# Patient Record
Sex: MALE | Race: WHITE | NOT HISPANIC OR LATINO | Employment: OTHER | URBAN - NONMETROPOLITAN AREA
[De-identification: names, ages, dates, MRNs, and addresses within clinical notes are randomized per-mention and may not be internally consistent; named-entity substitution may affect disease eponyms.]

---

## 2017-07-06 ENCOUNTER — OFFICE VISIT (OUTPATIENT)
Dept: FAMILY MEDICINE | Facility: OTHER | Age: 32
End: 2017-07-06
Attending: FAMILY MEDICINE
Payer: COMMERCIAL

## 2017-07-06 VITALS
DIASTOLIC BLOOD PRESSURE: 62 MMHG | OXYGEN SATURATION: 98 % | HEART RATE: 103 BPM | BODY MASS INDEX: 28.79 KG/M2 | TEMPERATURE: 98.7 F | SYSTOLIC BLOOD PRESSURE: 118 MMHG | RESPIRATION RATE: 18 BRPM | WEIGHT: 190 LBS | HEIGHT: 68 IN

## 2017-07-06 DIAGNOSIS — R21 RASH: ICD-10-CM
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
## 2017-07-06 PROCEDURE — 86618 LYME DISEASE ANTIBODY: CPT | Mod: 90 | Performed by: FAMILY MEDICINE

## 2017-07-06 PROCEDURE — 86666 EHRLICHIA ANTIBODY: CPT | Mod: 90 | Performed by: FAMILY MEDICINE

## 2017-07-06 PROCEDURE — 99213 OFFICE O/P EST LOW 20 MIN: CPT | Performed by: FAMILY MEDICINE

## 2017-07-06 PROCEDURE — 36415 COLL VENOUS BLD VENIPUNCTURE: CPT | Performed by: FAMILY MEDICINE

## 2017-07-06 PROCEDURE — 99000 SPECIMEN HANDLING OFFICE-LAB: CPT | Performed by: FAMILY MEDICINE

## 2017-07-06 RX ORDER — DOXYCYCLINE 100 MG/1
100 TABLET ORAL 2 TIMES DAILY
Qty: 28 TABLET | Refills: 0 | Status: SHIPPED | OUTPATIENT
Start: 2017-07-06 | End: 2017-07-10

## 2017-07-06 ASSESSMENT — PAIN SCALES - GENERAL: PAINLEVEL: MODERATE PAIN (4)

## 2017-07-06 NOTE — NURSING NOTE
"Chief Complaint   Patient presents with     Tick Bite     pulled off tick last week, now yesterday has ring area on upper left chest. Body aches and headache       Initial /62  Pulse 103  Temp 98.7  F (37.1  C) (Tympanic)  Resp 18  Ht 5' 8\" (1.727 m)  Wt 190 lb (86.2 kg)  SpO2 98%  BMI 28.89 kg/m2 Estimated body mass index is 28.89 kg/(m^2) as calculated from the following:    Height as of this encounter: 5' 8\" (1.727 m).    Weight as of this encounter: 190 lb (86.2 kg).  Medication Reconciliation: complete   Susana Aaron      "

## 2017-07-06 NOTE — MR AVS SNAPSHOT
"              After Visit Summary   7/6/2017    Hrai Mederos    MRN: [id number]           Patient Information     Date Of Birth          1985        Visit Information        Provider Department      7/6/2017 2:00 PM Vinita Lay MD [facility] [place]        Today's Diagnoses     Tick bite, initial encounter    -  1       Follow-ups after your visit        Who to contact     If you have questions or need follow up information about today's clinic visit or your schedule please contact [facility] [place] directly at [phone].  Normal or non-critical lab and imaging results will be communicated to you by MyChart, letter or phone within 4 business days after the clinic has received the results. If you do not hear from us within 7 days, please contact the clinic through [employer] or phone. If you have a critical or abnormal lab result, we will notify you by phone as soon as possible.  Submit refill requests through [employer] or call your pharmacy and they will forward the refill request to us. Please allow 3 business days for your refill to be completed.          Additional Information About Your Visit        MyChart Information     [employer] gives you secure access to your electronic health record. If you see a primary care provider, you can also send messages to your care team and make appointments. If you have questions, please call your primary care clinic.  If you do not have a primary care provider, please call [phone] and they will assist you.        Care EveryWhere ID     This is your Care EveryWhere ID. This could be used by other organizations to access your [facility] medical records  [id number]        Your Vitals Were     Pulse Temperature Respirations Height Pulse Oximetry BMI (Body Mass Index)    103 98.7  F (37.1  C) (Tympanic) 18 5' 8\" (1.727 m) 98% 28.89 kg/m2       Blood Pressure from Last 3 Encounters:   07/06/17 118/62   05/10/16 124/74   05/09/16 124/76    Weight from Last " 3 Encounters:   07/06/17 190 lb (86.2 kg)   05/10/16 175 lb (79.4 kg)   05/09/16 183 lb (83 kg)              We Performed the Following     Anaplasma phagocytoph antibody IgG IgM     Lyme Disease Makayla with reflex to WB Serum          Today's Medication Changes          These changes are accurate as of: 7/6/17  2:32 PM.  If you have any questions, ask your nurse or doctor.               Start taking these medicines.        Dose/Directions    doxycycline Monohydrate 100 MG Tabs   Used for:  Tick bite, initial encounter   Started by:  Vinita Lay MD        Dose:  100 mg   Take 100 mg by mouth 2 times daily for 14 days   Quantity:  28 tablet   Refills:  0            Where to get your medicines      These medications were sent to [facility]  [address]  [address] [place] [address]     Phone:  [phone]     doxycycline Monohydrate 100 MG Tabs                Primary Care Provider Office Phone # Fax #    Vinita Lay [phone]       [facility] [place] [address]  [place] [address]        Equal Access to Services     [facility] AH: Hadii aad ku hadasho Soomaali, waaxda luqadaha, qaybta kaalmada adeegyada, cl paigein hayalexandern roxanne hutchins . So [facility] [phone].    ATENCIÓN: Si habla español, tiene a ahumada disposición servicios gratuitos de asistencia lingüística. [facility] [phone].    We comply with applicable federal civil rights laws and [place] laws. We do not discriminate on the basis of race, color, national origin, age, disability sex, sexual orientation or gender identity.            Thank you!     Thank you for choosing [facility]  for your care. Our goal is always to provide you with excellent care. Hearing back from our patients is one way we can continue to improve our services. Please take a few minutes to complete the written survey that you may receive in the mail after your visit with us. Thank you!              Your Updated Medication List - Protect others around you: Learn how to safely use, store and throw away your medicines at www.disposemymeds.org.          This list is accurate as of: 7/6/17  2:32 PM.  Always use your most recent med list.                   Brand Name Dispense Instructions for use Diagnosis    cetirizine 10 MG tablet    zyrTEC     Take 10 mg by mouth as needed for allergies        doxycycline Monohydrate 100 MG Tabs     28 tablet    Take 100 mg by mouth 2 times daily for 14 days    Tick bite, initial encounter

## 2017-07-07 NOTE — PROGRESS NOTES
[facility]    Hari Mederos, 32 year old, male presents with   Chief Complaint   Patient presents with     Tick Bite     pulled off tick 2-3 weeks ago, now yesterday has ring area on upper left chest. Body aches and headache since July 4. . He is sleeping more than usual, and not feeling well, tired and achy.       PAST MEDICAL HISTORY:  Past Medical History:   Diagnosis Date     Ventricular pre-excitation        PAST SURGICAL HISTORY:  Past Surgical History:   Procedure Laterality Date     HERNIA REPAIR       wisdom teeth         SOCIAL HISTORY  Social History     Social History     Marital status:      Spouse name: Nancy     Number of children: 0     Years of education: N/A     Occupational History     construction Self     Social History Main Topics     Smoking status: Never Smoker     Smokeless tobacco: Never Used     Alcohol use 1.5 oz/week     3 drink(s) per week     Drug use: Not on file     Sexual activity: Yes     Partners: Female     Other Topics Concern     Caffeine Concern No     Sleep Concern No     Stress Concern No     Weight Concern No     Social History Narrative       MEDICATIONS:  Prior to Admission medications    Medication Sig Start Date End Date Taking? Authorizing Provider   doxycycline Monohydrate 100 MG TABS Take 100 mg by mouth 2 times daily for 14 days 7/6/17 7/20/17 Yes Vinita Lay MD   cetirizine (ZYRTEC) 10 MG tablet Take 10 mg by mouth as needed for allergies    Reported, Patient       ALLERGIES:     Allergies   Allergen Reactions     Pollen Extract Other (See Comments)     Sneezing       ROS:  CONSTITUTIONAL:see above  E/M: NEGATIVE for ear, mouth and throat problems  R: NEGATIVE for significant cough or SOB  CV: NEGATIVE for chest pain, palpitations or peripheral edema  GI: NEGATIVE for nausea, abdominal pain, heartburn, or change in bowel habits  N: NEGATIVE for weakness, dizziness or paresthesias  P: NEGATIVE for changes in mood or  "affect    EXAM:  /62  Pulse 103  Temp 98.7  F (37.1  C) (Tympanic)  Resp 18  Ht 5' 8\" (1.727 m)  Wt 190 lb (86.2 kg)  SpO2 98%  BMI 28.89 kg/m2 Body mass index is 28.89 kg/(m^2).   GENERAL APPEARANCE: healthy, alert and no distress  EYES: Eyes grossly normal to inspection and conjunctivae and sclerae normal  HENT: ear canals and TM's normal and nose and mouth without ulcers or lesions  NECK: no adenopathy, no asymmetry, masses, or scars and thyroid normal to palpation  RESP: lungs clear to auscultation - no rales, rhonchi or wheezes  CV: regular rates and rhythm, normal S1 S2, no S3 or S4 and no murmur, click or rub  SKIN: erythematous rash 8 cm in diameter. This has a central area of erythema with a ring of clearing with further surrounding erythema over the left upper chest wall. There is a tender lymph node in the left outer axilla that Aron notes is smaller than it was  NEURO: Normal strength and tone, mentation intact and speech normal  PSYCH: mentation appears normal and affect normalfatigued    LABS AND IMAGING:     No results found for this or any previous visit.      ASSESSMENT/PLAN:  (W57.XXXA) Tick bite, initial encounter  (primary encounter diagnosis)  Comment: this appears to be lyme disease  Plan: Lyme Disease Makayla with reflex to WB Serum,         Anaplasma phagocytoph antibody IgG IgM,         doxycycline Monohydrate 100 MG TABS        Will check labs but go ahead and treat with Doxycycline 100 mg bid for 14 days  Will also check for Ehrlichiosis. Follow up for concerns    Vinita Lay MD  July 6, 2017          "

## 2017-07-10 LAB
A PHAGOCYTOPH IGG TITR SER IF: NORMAL {TITER}
A PHAGOCYTOPH IGM TITR SER IF: NORMAL {TITER}
B BURGDOR IGG+IGM SER QL: 0.06 (ref 0–0.89)

## 2017-07-10 RX ORDER — DOXYCYCLINE 100 MG/1
100 TABLET ORAL 2 TIMES DAILY
Qty: 14 TABLET | Refills: 0 | Status: SHIPPED | OUTPATIENT
Start: 2017-07-10 | End: 2017-07-17

## 2017-07-21 DIAGNOSIS — R21 RASH: ICD-10-CM
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
## 2017-07-21 PROCEDURE — 86617 LYME DISEASE ANTIBODY: CPT | Mod: 90 | Performed by: FAMILY MEDICINE

## 2017-07-21 PROCEDURE — 86618 LYME DISEASE ANTIBODY: CPT | Mod: 90 | Performed by: FAMILY MEDICINE

## 2017-07-21 PROCEDURE — 99000 SPECIMEN HANDLING OFFICE-LAB: CPT | Performed by: FAMILY MEDICINE

## 2017-07-21 PROCEDURE — 36415 COLL VENOUS BLD VENIPUNCTURE: CPT | Performed by: FAMILY MEDICINE

## 2017-07-25 LAB — B BURGDOR IGG+IGM SER QL: 3.51 (ref 0–0.89)

## 2017-07-27 PROBLEM — A69.20 LYME DISEASE: Status: ACTIVE | Noted: 2017-07-27

## 2017-07-27 LAB
B BURGDOR IGG SER QL IB: ABNORMAL
B BURGDOR IGM SER QL IB: ABNORMAL

## 2018-01-25 ENCOUNTER — APPOINTMENT (OUTPATIENT)
Dept: GENERAL RADIOLOGY | Facility: HOSPITAL | Age: 33
End: 2018-01-25
Attending: PHYSICIAN ASSISTANT
Payer: COMMERCIAL

## 2018-01-25 ENCOUNTER — HOSPITAL ENCOUNTER (EMERGENCY)
Facility: HOSPITAL | Age: 33
Discharge: HOME | End: 2018-01-25
Attending: PHYSICIAN ASSISTANT | Admitting: PHYSICIAN ASSISTANT
Payer: COMMERCIAL

## 2018-01-25 VITALS
OXYGEN SATURATION: 98 % | DIASTOLIC BLOOD PRESSURE: 92 MMHG | TEMPERATURE: 98.8 F | RESPIRATION RATE: 16 BRPM | SYSTOLIC BLOOD PRESSURE: 146 MMHG | HEART RATE: 87 BPM

## 2018-01-25 DIAGNOSIS — B34.9 VIRAL SYNDROME: ICD-10-CM

## 2018-01-25 LAB
ANION GAP SERPL CALCULATED.3IONS-SCNC: 8 MMOL/L (ref 3–14)
BASOPHILS # BLD AUTO: 0 10E9/L (ref 0–0.2)
BASOPHILS NFR BLD AUTO: 0.7 %
BUN SERPL-MCNC: 12 MG/DL (ref 7–30)
CALCIUM SERPL-MCNC: 8.9 MG/DL (ref 8.5–10.1)
CHLORIDE SERPL-SCNC: 102 MMOL/L (ref 94–109)
CO2 SERPL-SCNC: 29 MMOL/L (ref 20–32)
CREAT SERPL-MCNC: 1.02 MG/DL (ref 0.66–1.25)
DIFFERENTIAL METHOD BLD: NORMAL
EOSINOPHIL # BLD AUTO: 0 10E9/L (ref 0–0.7)
EOSINOPHIL NFR BLD AUTO: 0.4 %
ERYTHROCYTE [DISTWIDTH] IN BLOOD BY AUTOMATED COUNT: 12.2 % (ref 10–15)
FLUAV+FLUBV AG SPEC QL: NEGATIVE
FLUAV+FLUBV AG SPEC QL: NEGATIVE
GFR SERPL CREATININE-BSD FRML MDRD: 83 ML/MIN/1.7M2
GLUCOSE SERPL-MCNC: 100 MG/DL (ref 70–99)
HCT VFR BLD AUTO: 46 % (ref 40–53)
HGB BLD-MCNC: 16.2 G/DL (ref 13.3–17.7)
IMM GRANULOCYTES # BLD: 0 10E9/L (ref 0–0.4)
IMM GRANULOCYTES NFR BLD: 0.2 %
LYMPHOCYTES # BLD AUTO: 2.2 10E9/L (ref 0.8–5.3)
LYMPHOCYTES NFR BLD AUTO: 39.7 %
MCH RBC QN AUTO: 29.3 PG (ref 26.5–33)
MCHC RBC AUTO-ENTMCNC: 35.2 G/DL (ref 31.5–36.5)
MCV RBC AUTO: 83 FL (ref 78–100)
MONOCYTES # BLD AUTO: 0.6 10E9/L (ref 0–1.3)
MONOCYTES NFR BLD AUTO: 10.6 %
NEUTROPHILS # BLD AUTO: 2.7 10E9/L (ref 1.6–8.3)
NEUTROPHILS NFR BLD AUTO: 48.4 %
NRBC # BLD AUTO: 0 10*3/UL
NRBC BLD AUTO-RTO: 0 /100
PLATELET # BLD AUTO: 199 10E9/L (ref 150–450)
POTASSIUM SERPL-SCNC: 4 MMOL/L (ref 3.4–5.3)
RBC # BLD AUTO: 5.52 10E12/L (ref 4.4–5.9)
SODIUM SERPL-SCNC: 139 MMOL/L (ref 133–144)
SPECIMEN SOURCE: NORMAL
WBC # BLD AUTO: 5.6 10E9/L (ref 4–11)

## 2018-01-25 PROCEDURE — 87804 INFLUENZA ASSAY W/OPTIC: CPT | Mod: 59 | Performed by: FAMILY MEDICINE

## 2018-01-25 PROCEDURE — 71046 X-RAY EXAM CHEST 2 VIEWS: CPT | Mod: TC

## 2018-01-25 PROCEDURE — 25000132 ZZH RX MED GY IP 250 OP 250 PS 637: Performed by: PHYSICIAN ASSISTANT

## 2018-01-25 PROCEDURE — 36415 COLL VENOUS BLD VENIPUNCTURE: CPT | Performed by: PHYSICIAN ASSISTANT

## 2018-01-25 PROCEDURE — 25000125 ZZHC RX 250: Performed by: PHYSICIAN ASSISTANT

## 2018-01-25 PROCEDURE — G0463 HOSPITAL OUTPT CLINIC VISIT: HCPCS | Mod: 25

## 2018-01-25 PROCEDURE — 99213 OFFICE O/P EST LOW 20 MIN: CPT | Performed by: PHYSICIAN ASSISTANT

## 2018-01-25 PROCEDURE — 80048 BASIC METABOLIC PNL TOTAL CA: CPT | Performed by: PHYSICIAN ASSISTANT

## 2018-01-25 PROCEDURE — 85025 COMPLETE CBC W/AUTO DIFF WBC: CPT | Performed by: PHYSICIAN ASSISTANT

## 2018-01-25 RX ADMIN — LIDOCAINE HYDROCHLORIDE 15 ML GIVEN: 20 SOLUTION ORAL; TOPICAL at 21:09

## 2018-01-25 ASSESSMENT — ENCOUNTER SYMPTOMS
EYE DISCHARGE: 0
HEADACHES: 0
COUGH: 0
PSYCHIATRIC NEGATIVE: 1
DIZZINESS: 0
SHORTNESS OF BREATH: 0
FATIGUE: 1
LIGHT-HEADEDNESS: 0
MYALGIAS: 1
WHEEZING: 0
TROUBLE SWALLOWING: 0
CHILLS: 0
CARDIOVASCULAR NEGATIVE: 1
SINUS PRESSURE: 0
SORE THROAT: 0
RHINORRHEA: 0
FEVER: 0
CHEST TIGHTNESS: 1

## 2018-01-25 NOTE — ED AVS SNAPSHOT
[facility]    [address]    [place] [place] [address]    Phone:  [phone]                                       Hari Mederso   MRN: [id number]    Department:  [facility]   Date of Visit:  1/25/2018           Patient Information     Date Of Birth          1985        Your diagnoses for this visit were:     Viral syndrome        You were seen by Peng Gaspar PA.      Follow-up Information     Follow up with Vinita Lay MD In 1 week.    Specialty:  Family Practice    Contact information:    [facility] [place]  [address]  [place] [address]  [phone]          Follow up with [facility].    Specialty:  EMERGENCY MEDICINE    Why:  If symptoms worsen    Contact information:    [address] [place] [address] [phone]    Additional information:    From [facility] ([place], [place]): Take [address]. Turn left at [address]. Turn left at the first stoplight on [address] [place] [address]. At the first stop sign, take a right onto [address]. Take a left into the parking lot and continue through until you reach the North enterance of the building.       From [place]: Take [address]. Take the [address] ramp towards [place]. Turn left onto [address]. Take a slight right onto [address]. Turn left at the first stoplight on [address]. At the first stop sign, take a right onto [address]. Take a left into the parking lot and continue through until you reach the North enterance of the building.       From [place]: Take [address]. Take a right at [address]. At the first stop sign, take a right onto [address]. Take a left into the parking lot and continue through until you reach the North enterance of the building.         Discharge Instructions       - rest, fluids  - Ibuprofen/tlyenol rotation  - Consider the following over-the-counter product as antiviral -  sambucus/elderberry for viral upper-respiratory symptoms.        Discharge References/Attachments     VIRAL SYNDROME (ADULT) (ENGLISH)         Review of your medicines      Our records show that you are taking the medicines listed below. If these are incorrect, please call your family doctor or clinic.        Dose / Directions Last dose taken    cetirizine 10 MG tablet   Commonly known as:  zyrTEC   Dose:  10 mg        Take 10 mg by mouth as needed for allergies   Refills:  0                Procedures and tests performed during your visit     Basic metabolic panel    CBC with platelets differential    Chest XR,  PA & LAT    Influenza A/B antigen      Orders Needing Specimen Collection     None      Pending Results     No orders found from 1/23/2018 to 1/26/2018.            Pending Culture Results     No orders found from 1/23/2018 to 1/26/2018.            Thank you for choosing [facility]       Thank you for choosing [facility] for your care. Our goal is always to provide you with excellent care. Hearing back from our patients is one way we can continue to improve our services. Please take a few minutes to complete the written survey that you may receive in the mail after you visit with us. Thank you!        [employer] Information     [employer] gives you secure access to your electronic health record. If you see a primary care provider, you can also send messages to your care team and make appointments. If you have questions, please call your primary care clinic.  If you do not have a primary care provider, please call [phone] and they will assist you.        Care EveryWhere ID     This is your Care EveryWhere ID. This could be used by other organizations to access your [facility] medical records  [id number]        Equal Access to Services     TRISH JOHNSON : Rory Ann, juani horvath, cl servin. So [facility] [phone].    ATENCIÓN: Jenn ray  español, tiene a ahumada disposición servicios gratuitos de asistencia lingüística. Farrah al [phone].    We comply with applicable federal civil rights laws and [place] laws. We do not discriminate on the basis of race, color, national origin, age, disability, sex, sexual orientation, or gender identity.            After Visit Summary       This is your record. Keep this with you and show to your community pharmacist(s) and doctor(s) at your next visit.

## 2018-01-25 NOTE — ED AVS SNAPSHOT
[facility]    [address]    [place] [address]    Phone:  [phone]                                       Hari Mederos   MRN: [id number]    Department:  [facility]   Date of Visit:  1/25/2018           After Visit Summary Signature Page     I have received my discharge instructions, and my questions have been answered. I have discussed any challenges I see with this plan with the nurse or doctor.    ..........................................................................................................................................  Patient/Patient Representative Signature      ..........................................................................................................................................  Patient Representative Print Name and Relationship to Patient    ..................................................               ................................................  Date                                            Time    ..........................................................................................................................................  Reviewed by Signature/Title    ...................................................              ..............................................  Date                                                            Time

## 2018-01-26 NOTE — ED PROVIDER NOTES
"  History     Chief Complaint   Patient presents with     Flu Symptoms     HPI  Hari Mederos is a 32 year old male who presents with 24 hrs fatigue, body aches, \"cold feeling in lungs\", throat feels funny when his lungs feel cold - warm water usually helps with the cold lung sensation. Cough is very infrequent. No fevers. He has not taken anything for symptoms today. No ill contacts.     Problem List:    Patient Active Problem List    Diagnosis Date Noted     Lyme disease 07/27/2017     Priority: Medium     treated          Past Medical History:    Past Medical History:   Diagnosis Date     Ventricular pre-excitation        Past Surgical History:    Past Surgical History:   Procedure Laterality Date     HERNIA REPAIR       wisdom teeth         Family History:    Family History   Problem Relation Age of Onset     Other - See Comments Mother      Graves Disease     DIABETES Father      Arthritis Father      CEREBROVASCULAR DISEASE Paternal Grandfather      Arthritis Mother      Rheumatoid arthritis     DIABETES Maternal Grandmother      Alzheimer Disease Maternal Grandmother      CANCER Other      cousin brain tumor     Congenital Anomalies Other      cousin cystic fibrosis       Social History:  Marital Status:   [2]  Social History   Substance Use Topics     Smoking status: Never Smoker     Smokeless tobacco: Never Used     Alcohol use 1.5 oz/week     3 drink(s) per week        Medications:      cetirizine (ZYRTEC) 10 MG tablet         Review of Systems   Constitutional: Positive for fatigue. Negative for chills and fever.   HENT: Negative for congestion, ear pain, postnasal drip, rhinorrhea, sinus pressure, sore throat and trouble swallowing.    Eyes: Negative for discharge.   Respiratory: Positive for chest tightness (\"cold lungs\"). Negative for cough, shortness of breath and wheezing.    Cardiovascular: Negative.    Musculoskeletal: Positive for myalgias.   Skin: Negative.    Neurological: Negative " "for dizziness, light-headedness and headaches.   Psychiatric/Behavioral: Negative.        Physical Exam   BP: 146/92  Pulse: 87  Temp: 98.8  F (37.1  C)  Resp: 16  SpO2: 98 %      Physical Exam   Constitutional: He is oriented to person, place, and time. He appears well-developed and well-nourished. No distress.   HENT:   Head: Normocephalic and atraumatic.   Right Ear: External ear normal.   Left Ear: External ear normal.   Nose: Nose normal.   Mouth/Throat: Oropharynx is clear and moist. No oropharyngeal exudate.   Eyes: Conjunctivae and EOM are normal. Right eye exhibits no discharge. Left eye exhibits no discharge.   Neck: Normal range of motion. Neck supple.   Cardiovascular: Normal rate, regular rhythm and normal heart sounds.    Pulmonary/Chest: Effort normal and breath sounds normal. He has no wheezes. He has no rales.   Abdominal: Soft. Bowel sounds are normal. There is no tenderness.   Neurological: He is alert and oriented to person, place, and time.   Skin: Skin is warm and dry.   Psychiatric: He has a normal mood and affect.   Nursing note and vitals reviewed.      ED Course     ED Course     Procedures  Medications   lidocaine (viscous) (XYLOCAINE) 2 % 15 mL, alum & mag hydroxide-simethicone (MYLANTA ES/MAALOX  ES) 15 mL GI Cocktail (15 ml given Oral Given 1/25/18 2109)       Labs Ordered and Resulted from Time of ED Arrival Up to the Time of Departure from the ED   BASIC METABOLIC PANEL - Abnormal; Notable for the following:        Result Value    Glucose 100 (*)     All other components within normal limits   CBC WITH PLATELETS DIFFERENTIAL   INFLUENZA A/B ANTIGEN     PROCEDURE:  XR CHEST 2 VW     HISTORY:  \"cold lungs\"; .      COMPARISON:  None.     FINDINGS:   The cardiac silhouette is normal in size. The pulmonary vasculature is  normal.  The lungs are clear. No pleural effusion or pneumothorax.         IMPRESSION:  No acute cardiopulmonary disease.       DELMA CALDWELL MD    Assessments & Plan " (with Medical Decision Making)     I have reviewed the nursing notes.  I have reviewed the findings, diagnosis, plan and need for follow up with the patient.    Discharge Medication List as of 1/25/2018 10:20 PM          Final diagnoses:   Viral syndrome   Influenza swab negative. CXR, CBC, BMP acceptable. Attempted GI cocktail as warm water helps with chest sensation and this did give temporary relief. Discussed rest, ibu, fluids for 3-5 days and treat as viral. Follow up with Primary Care Provider in 5-7 days with poor improvement. Seek attention sooner with worsening despite treatment. Patient verbally educated and given appropriate education sheets for each of the diagnoses and has no questions.    Peng Gaspar PA-C   1/26/2018   8:15 AM       1/25/2018   [facility]     Peng Gaspar PA  01/26/18 0816

## 2018-01-26 NOTE — DISCHARGE INSTRUCTIONS
- rest, fluids  - Ibuprofen/tlyenol rotation  - Consider the following over-the-counter product as antiviral - sambucus/elderberry for viral upper-respiratory symptoms.

## 2018-02-02 ENCOUNTER — TELEPHONE (OUTPATIENT)
Dept: CARDIOLOGY | Facility: OTHER | Age: 33
End: 2018-02-02

## 2018-02-02 ENCOUNTER — OFFICE VISIT (OUTPATIENT)
Dept: INTERNAL MEDICINE | Facility: OTHER | Age: 33
End: 2018-02-02
Attending: INTERNAL MEDICINE
Payer: COMMERCIAL

## 2018-02-02 ENCOUNTER — RADIANT APPOINTMENT (OUTPATIENT)
Dept: GENERAL RADIOLOGY | Facility: OTHER | Age: 33
End: 2018-02-02
Attending: INTERNAL MEDICINE
Payer: COMMERCIAL

## 2018-02-02 VITALS
BODY MASS INDEX: 28.04 KG/M2 | HEART RATE: 81 BPM | DIASTOLIC BLOOD PRESSURE: 78 MMHG | SYSTOLIC BLOOD PRESSURE: 118 MMHG | OXYGEN SATURATION: 96 % | TEMPERATURE: 98.3 F | WEIGHT: 185 LBS | HEIGHT: 68 IN

## 2018-02-02 DIAGNOSIS — R10.84 ABDOMINAL PAIN, GENERALIZED: Primary | ICD-10-CM

## 2018-02-02 DIAGNOSIS — R10.84 ABDOMINAL PAIN, GENERALIZED: ICD-10-CM

## 2018-02-02 DIAGNOSIS — R53.83 OTHER FATIGUE: ICD-10-CM

## 2018-02-02 LAB
ALBUMIN SERPL-MCNC: 4.1 G/DL (ref 3.4–5)
ALBUMIN UR-MCNC: NEGATIVE MG/DL
ALP SERPL-CCNC: 43 U/L (ref 40–150)
ALT SERPL W P-5'-P-CCNC: 25 U/L (ref 0–70)
AMYLASE SERPL-CCNC: 31 U/L (ref 30–110)
ANION GAP SERPL CALCULATED.3IONS-SCNC: 7 MMOL/L (ref 3–14)
APPEARANCE UR: CLEAR
AST SERPL W P-5'-P-CCNC: 11 U/L (ref 0–45)
BASOPHILS # BLD AUTO: 0 10E9/L (ref 0–0.2)
BASOPHILS NFR BLD AUTO: 0.7 %
BILIRUB SERPL-MCNC: 1.4 MG/DL (ref 0.2–1.3)
BILIRUB UR QL STRIP: NEGATIVE
BUN SERPL-MCNC: 13 MG/DL (ref 7–30)
CALCIUM SERPL-MCNC: 9 MG/DL (ref 8.5–10.1)
CHLORIDE SERPL-SCNC: 102 MMOL/L (ref 94–109)
CO2 SERPL-SCNC: 30 MMOL/L (ref 20–32)
COLOR UR AUTO: NORMAL
CREAT SERPL-MCNC: 1.04 MG/DL (ref 0.66–1.25)
DIFFERENTIAL METHOD BLD: NORMAL
EOSINOPHIL # BLD AUTO: 0 10E9/L (ref 0–0.7)
EOSINOPHIL NFR BLD AUTO: 0.2 %
ERYTHROCYTE [DISTWIDTH] IN BLOOD BY AUTOMATED COUNT: 12.1 % (ref 10–15)
GFR SERPL CREATININE-BSD FRML MDRD: 81 ML/MIN/1.7M2
GLUCOSE SERPL-MCNC: 91 MG/DL (ref 70–99)
GLUCOSE UR STRIP-MCNC: NEGATIVE MG/DL
HCT VFR BLD AUTO: 46.6 % (ref 40–53)
HETEROPH AB SER QL: NEGATIVE
HGB BLD-MCNC: 16.5 G/DL (ref 13.3–17.7)
HGB UR QL STRIP: NEGATIVE
IMM GRANULOCYTES # BLD: 0 10E9/L (ref 0–0.4)
IMM GRANULOCYTES NFR BLD: 0.2 %
KETONES UR STRIP-MCNC: NEGATIVE MG/DL
LEUKOCYTE ESTERASE UR QL STRIP: NEGATIVE
LIPASE SERPL-CCNC: 99 U/L (ref 73–393)
LYMPHOCYTES # BLD AUTO: 2.1 10E9/L (ref 0.8–5.3)
LYMPHOCYTES NFR BLD AUTO: 33.9 %
MCH RBC QN AUTO: 29.5 PG (ref 26.5–33)
MCHC RBC AUTO-ENTMCNC: 35.4 G/DL (ref 31.5–36.5)
MCV RBC AUTO: 83 FL (ref 78–100)
MONOCYTES # BLD AUTO: 0.5 10E9/L (ref 0–1.3)
MONOCYTES NFR BLD AUTO: 7.9 %
NEUTROPHILS # BLD AUTO: 3.5 10E9/L (ref 1.6–8.3)
NEUTROPHILS NFR BLD AUTO: 57.1 %
NITRATE UR QL: NEGATIVE
NRBC # BLD AUTO: 0 10*3/UL
NRBC BLD AUTO-RTO: 0 /100
PH UR STRIP: 6 PH (ref 4.7–8)
PLATELET # BLD AUTO: 200 10E9/L (ref 150–450)
POTASSIUM SERPL-SCNC: 3.8 MMOL/L (ref 3.4–5.3)
PROT SERPL-MCNC: 8.2 G/DL (ref 6.8–8.8)
RBC # BLD AUTO: 5.6 10E12/L (ref 4.4–5.9)
SODIUM SERPL-SCNC: 139 MMOL/L (ref 133–144)
SOURCE: NORMAL
SP GR UR STRIP: 1.01 (ref 1–1.03)
UROBILINOGEN UR STRIP-MCNC: NORMAL MG/DL (ref 0–2)
WBC # BLD AUTO: 6.1 10E9/L (ref 4–11)

## 2018-02-02 PROCEDURE — 99000 SPECIMEN HANDLING OFFICE-LAB: CPT | Performed by: INTERNAL MEDICINE

## 2018-02-02 PROCEDURE — 85025 COMPLETE CBC W/AUTO DIFF WBC: CPT | Performed by: INTERNAL MEDICINE

## 2018-02-02 PROCEDURE — 86617 LYME DISEASE ANTIBODY: CPT | Mod: 90 | Performed by: INTERNAL MEDICINE

## 2018-02-02 PROCEDURE — 74019 RADEX ABDOMEN 2 VIEWS: CPT | Mod: TC

## 2018-02-02 PROCEDURE — 83690 ASSAY OF LIPASE: CPT | Performed by: INTERNAL MEDICINE

## 2018-02-02 PROCEDURE — 86308 HETEROPHILE ANTIBODY SCREEN: CPT | Performed by: INTERNAL MEDICINE

## 2018-02-02 PROCEDURE — 36415 COLL VENOUS BLD VENIPUNCTURE: CPT | Performed by: INTERNAL MEDICINE

## 2018-02-02 PROCEDURE — 82150 ASSAY OF AMYLASE: CPT | Performed by: INTERNAL MEDICINE

## 2018-02-02 PROCEDURE — 80053 COMPREHEN METABOLIC PANEL: CPT | Performed by: INTERNAL MEDICINE

## 2018-02-02 PROCEDURE — 99204 OFFICE O/P NEW MOD 45 MIN: CPT | Mod: 25 | Performed by: INTERNAL MEDICINE

## 2018-02-02 PROCEDURE — 81003 URINALYSIS AUTO W/O SCOPE: CPT | Performed by: INTERNAL MEDICINE

## 2018-02-02 PROCEDURE — 93000 ELECTROCARDIOGRAM COMPLETE: CPT | Performed by: INTERNAL MEDICINE

## 2018-02-02 ASSESSMENT — ANXIETY QUESTIONNAIRES
3. WORRYING TOO MUCH ABOUT DIFFERENT THINGS: NOT AT ALL
7. FEELING AFRAID AS IF SOMETHING AWFUL MIGHT HAPPEN: NOT AT ALL
5. BEING SO RESTLESS THAT IT IS HARD TO SIT STILL: NOT AT ALL
1. FEELING NERVOUS, ANXIOUS, OR ON EDGE: NOT AT ALL
GAD7 TOTAL SCORE: 0
6. BECOMING EASILY ANNOYED OR IRRITABLE: NOT AT ALL
2. NOT BEING ABLE TO STOP OR CONTROL WORRYING: NOT AT ALL

## 2018-02-02 ASSESSMENT — PATIENT HEALTH QUESTIONNAIRE - PHQ9: 5. POOR APPETITE OR OVEREATING: NOT AT ALL

## 2018-02-02 ASSESSMENT — PAIN SCALES - GENERAL: PAINLEVEL: NO PAIN (1)

## 2018-02-02 NOTE — TELEPHONE ENCOUNTER
Please call patient and schedule him to see Dr. Elise for follow up.  Patient has not been seen since 5/2016.   Thanks. Laura.

## 2018-02-02 NOTE — MR AVS SNAPSHOT
"              After Visit Summary   2/2/2018    Hari Mederos    MRN: [id number]           Patient Information     Date Of Birth          1985        Visit Information        Provider Department      2/2/2018 10:15 AM Nahum Barbosa, DO [facility]        Today's Diagnoses     Abdominal pain, generalized    -  1    Other fatigue           Follow-ups after your visit        Who to contact     If you have questions or need follow up information about today's clinic visit or your schedule please contact [facility] directly at [phone].  Normal or non-critical lab and imaging results will be communicated to you by [employer], letter or phone within 4 business days after the clinic has received the results. If you do not hear from us within 7 days, please contact the clinic through [employer] or phone. If you have a critical or abnormal lab result, we will notify you by phone as soon as possible.  Submit refill requests through [employer] or call your pharmacy and they will forward the refill request to us. Please allow 3 business days for your refill to be completed.          Additional Information About Your Visit        MyChart Information     [employer] gives you secure access to your electronic health record. If you see a primary care provider, you can also send messages to your care team and make appointments. If you have questions, please call your primary care clinic.  If you do not have a primary care provider, please call [phone] and they will assist you.        Care EveryWhere ID     This is your Care EveryWhere ID. This could be used by other organizations to access your [place] medical records  [id number]        Your Vitals Were     Pulse Temperature Height Pulse Oximetry BMI (Body Mass Index)       81 98.3  F (36.8  C) (Tympanic) 5' 8\" (1.727 m) 96% 28.13 kg/m2        Blood Pressure from Last 3 Encounters:   02/02/18 118/78   01/25/18 146/92   07/06/17 118/62    " Weight from Last 3 Encounters:   02/02/18 185 lb (83.9 kg)   07/06/17 190 lb (86.2 kg)   05/10/16 175 lb (79.4 kg)              We Performed the Following     Amylase     CBC with platelets and differential     Comprehensive metabolic panel (BMP + Alb, Alk Phos, ALT, AST, Total. Bili, TP)     EKG 12-lead complete w/read - (Clinic Performed)     Lipase     Lyme Conf IgG and IgM by Immunoblot     Mononucleosis screen     UA reflex to Microscopic and Culture - [place]          Today's Medication Changes          These changes are accurate as of 2/2/18 10:58 AM.  If you have any questions, ask your nurse or doctor.               Start taking these medicines.        Dose/Directions    ranitidine 150 MG tablet   Commonly known as:  ZANTAC   Used for:  Abdominal pain, generalized   Started by:  Nahum Barbosa DO        Dose:  150 mg   Take 1 tablet (150 mg) by mouth 2 times daily   Quantity:  60 tablet   Refills:  1            Where to get your medicines      These medications were sent to [facility] [place] [id number]  [address] [place] [address]     Phone:  [phone]     ranitidine 150 MG tablet                Primary Care Provider Office Phone # Fax #    Vinita Lay [phone]       [facility] [address]  [facility] [address]        Equal Access to Services     JOSELYN JOHNSON : Hadii baron lopez hadasho Sojadenali, waaxda luqadaha, qaybta kaalmada adeegyada, cl freire. So [facility] [phone].    ATENCIÓN: Si habla español, tiene a ahumada disposición servicios gratuitos de asistencia lingüística. Farrah al [phone].    We comply with applicable federal civil rights laws and [place] laws. We do not discriminate on the basis of race, color, national origin, age, disability, sex, sexual orientation, or gender identity.            Thank you!     Thank you for choosing [facility]  for your care. Our goal is  always to provide you with excellent care. Hearing back from our patients is one way we can continue to improve our services. Please take a few minutes to complete the written survey that you may receive in the mail after your visit with us. Thank you!             Your Updated Medication List - Protect others around you: Learn how to safely use, store and throw away your medicines at www.disposemymeds.org.          This list is accurate as of 2/2/18 10:58 AM.  Always use your most recent med list.                   Brand Name Dispense Instructions for use Diagnosis    cetirizine 10 MG tablet    zyrTEC     Take 10 mg by mouth as needed for allergies        ranitidine 150 MG tablet    ZANTAC    60 tablet    Take 1 tablet (150 mg) by mouth 2 times daily    Abdominal pain, generalized

## 2018-02-02 NOTE — NURSING NOTE
"Chief Complaint   Patient presents with     Bloated     since last wednesday having stomach pain that comes and goes \"dull and annoying\" wakes him up- feels bloated- no gas, constipation, no fever        Initial /78 (BP Location: Left arm, Patient Position: Supine, Cuff Size: Adult Large)  Pulse 81  Temp 98.3  F (36.8  C) (Tympanic)  Ht 5' 8\" (1.727 m)  Wt 185 lb (83.9 kg)  SpO2 96%  BMI 28.13 kg/m2 Estimated body mass index is 28.13 kg/(m^2) as calculated from the following:    Height as of this encounter: 5' 8\" (1.727 m).    Weight as of this encounter: 185 lb (83.9 kg).  Medication Reconciliation: complete   Vicki Melton LPN      "

## 2018-02-02 NOTE — PROGRESS NOTES
"SUBJECTIVE:    Chief Complaint   Patient presents with     Bloated     since last wednesday having stomach pain that comes and goes \"dull and annoying\" wakes him up- feels bloated- no gas, constipation, no fever        Hari Mederos is a 32 year old male with pmh of WPW and Lyme's disease who presents today due to abdominal pain.  He was seen in the ED recently for flu like symptoms but he tested negative for influenza.  He described fatigue and cold sensation in his chest.  Today he reports that resolved but now feels fatigued once again.  Still a \"cold sensation in lungs\" and now some abdominal discomfort.  He denies any pain and states he may feel bloated.  He has some trouble describing his discomfort and reports it has actually resolved for now.  No N/V/D. He does describe some degree of constipation.             Past Medical History:   Diagnosis Date     Ventricular pre-excitation          Past Surgical History:   Procedure Laterality Date     HERNIA REPAIR       wisdom teeth          Allergies   Allergen Reactions     Pollen Extract Other (See Comments)     Sneezing       Medications:  Current Outpatient Prescriptions   Medication Sig Dispense Refill     ranitidine (ZANTAC) 150 MG tablet Take 1 tablet (150 mg) by mouth 2 times daily 60 tablet 1     cetirizine (ZYRTEC) 10 MG tablet Take 10 mg by mouth as needed for allergies         Family History   Problem Relation Age of Onset     Other - See Comments Mother      Graves Disease     Arthritis Mother      Rheumatoid arthritis     DIABETES Father      Arthritis Father      CEREBROVASCULAR DISEASE Paternal Grandfather      DIABETES Maternal Grandmother      Alzheimer Disease Maternal Grandmother      CANCER Other      cousin brain tumor     Congenital Anomalies Other      cousin cystic fibrosis       Social History   Substance Use Topics     Smoking status: Never Smoker     Smokeless tobacco: Never Used     Alcohol use 1.5 oz/week     3 Standard drinks or " "equivalent per week     Social History     Social History Narrative        Review Of Systems  Constitutional: Fatigue and Malaise  Eyes: negative  Ears/Nose/Throat: negative  Cardiovascular: negative  Respiratory: No shortness of breath, dyspnea on exertion, cough, or hemoptysis  Gastrointestinal: heartburn(?) and abdominal pain  Genitourinary: negative  Musculoskeletal: negative  Skin: negative  Neurologic: negative  Endocrine: negative  Hematologic/Lymphatic/Immunologic: negative  Psychiatric: negative    OBJECTIVE:  /78 (BP Location: Left arm, Patient Position: Supine, Cuff Size: Adult Large)  Pulse 81  Temp 98.3  F (36.8  C) (Tympanic)  Ht 5' 8\" (1.727 m)  Wt 185 lb (83.9 kg)  SpO2 96%  BMI 28.13 kg/m2  Body mass index is 28.13 kg/(m^2).   Gen: Well-developed, well-nourished and in no apparent distress  HEENT:  Normocephalic, atraumatic, PERRL, no scleral icterus, TMs visualized bilaterally without effusion/erythema, external auditory canals clear.  Cranial nerves grossly intact.  Neck: supple, no LAD, no thyromegaly  CV: regular rate and rhythm, normal S1 S2, no S3 or S4 and no murmur, click, or rub  Resp: Clear to ausculation bilaterally, normal respiratory effort  Abd: Bowel sounds present, soft NT/ND,  no masses or hepatosplenomegaly.  States abdominal pain not present right now.    Ext: NO LE edema.    Neuro:  No focal deficits noted  Skin: warm and dry  Psych: normal mood/affect, appropriately oriented      EKG:  WPW    PROCEDURE: XR ABDOMEN 2 VW 2/2/2018 10:27 AM     HISTORY: ; Abdominal pain, generalized     COMPARISONS: None.     TECHNIQUE: Flat and upright     FINDINGS: There is a normal intestinal gas pattern. No extraluminal  gas or pathologic intra-abdominal calcifications are noted.          IMPRESSION: Normal abdominal gas pattern     DELMA CALDWELL MD    Results for orders placed or performed in visit on 02/02/18   Comprehensive metabolic panel (BMP + Alb, Alk Phos, ALT, AST, Total. " Bili, TP)   Result Value Ref Range    Sodium 139 133 - 144 mmol/L    Potassium 3.8 3.4 - 5.3 mmol/L    Chloride 102 94 - 109 mmol/L    Carbon Dioxide 30 20 - 32 mmol/L    Anion Gap 7 3 - 14 mmol/L    Glucose 91 70 - 99 mg/dL    Urea Nitrogen 13 7 - 30 mg/dL    Creatinine 1.04 0.66 - 1.25 mg/dL    GFR Estimate 81 >60 mL/min/1.7m2    GFR Estimate If Black >90 >60 mL/min/1.7m2    Calcium 9.0 8.5 - 10.1 mg/dL    Bilirubin Total 1.4 (H) 0.2 - 1.3 mg/dL    Albumin 4.1 3.4 - 5.0 g/dL    Protein Total 8.2 6.8 - 8.8 g/dL    Alkaline Phosphatase 43 40 - 150 U/L    ALT 25 0 - 70 U/L    AST 11 0 - 45 U/L   CBC with platelets and differential   Result Value Ref Range    WBC 6.1 4.0 - 11.0 10e9/L    RBC Count 5.60 4.4 - 5.9 10e12/L    Hemoglobin 16.5 13.3 - 17.7 g/dL    Hematocrit 46.6 40.0 - 53.0 %    MCV 83 78 - 100 fl    MCH 29.5 26.5 - 33.0 pg    MCHC 35.4 31.5 - 36.5 g/dL    RDW 12.1 10.0 - 15.0 %    Platelet Count 200 150 - 450 10e9/L    Diff Method Automated Method     % Neutrophils 57.1 %    % Lymphocytes 33.9 %    % Monocytes 7.9 %    % Eosinophils 0.2 %    % Basophils 0.7 %    % Immature Granulocytes 0.2 %    Nucleated RBCs 0 0 /100    Absolute Neutrophil 3.5 1.6 - 8.3 10e9/L    Absolute Lymphocytes 2.1 0.8 - 5.3 10e9/L    Absolute Monocytes 0.5 0.0 - 1.3 10e9/L    Absolute Eosinophils 0.0 0.0 - 0.7 10e9/L    Absolute Basophils 0.0 0.0 - 0.2 10e9/L    Abs Immature Granulocytes 0.0 0 - 0.4 10e9/L    Absolute Nucleated RBC 0.0    Amylase   Result Value Ref Range    Amylase 31 30 - 110 U/L   Lipase   Result Value Ref Range    Lipase 99 73 - 393 U/L   UA reflex to Microscopic and Culture - [place]   Result Value Ref Range    Color Urine Light Yellow     Appearance Urine Clear     Glucose Urine Negative NEG^Negative mg/dL    Bilirubin Urine Negative NEG^Negative    Ketones Urine Negative NEG^Negative mg/dL    Specific Gravity Urine 1.006 1.003 - 1.035    Blood Urine Negative NEG^Negative    pH Urine 6.0 4.7 - 8.0 pH     Protein Albumin Urine Negative NEG^Negative mg/dL    Urobilinogen mg/dL Normal 0.0 - 2.0 mg/dL    Nitrite Urine Negative NEG^Negative    Leukocyte Esterase Urine Negative NEG^Negative    Source Midstream Urine    Mononucleosis screen   Result Value Ref Range    Mononucleosis Screen Negative NEG^Negative     ASSESSMENT/PLAN:  Pt is a 32 year old male     Hari was seen today for bloated.    Diagnoses and all orders for this visit:    Abdominal pain, generalized  -     Comprehensive metabolic panel (BMP + Alb, Alk Phos, ALT, AST, Total. Bili, TP)  -     CBC with platelets and differential  -     Amylase  -     Lipase  -     UA reflex to Microscopic and Culture - [place]  -     XR ABDOMEN 2 VW (Clinic Performed); Future  -     Mononucleosis screen  -     ranitidine (ZANTAC) 150 MG tablet; Take 1 tablet (150 mg) by mouth 2 times daily    Other fatigue: Both him and his wife are quite concerned as to Lyme's reactivation which is unlikely.    -     Lyme Conf IgG and IgM by Immunoblot  -     EKG 12-lead complete w/read - (Clinic Performed)       The work up was essentially negative.  Possibly some dyspepsia.  Trial of Zantac for now.  In addition encouraged him to complete the treadmill test as requested by Dr. Elise.      Return to clinic prn.        Nahum Barbosa D.O.    Cc: Dr. Lubna Lay

## 2018-02-03 ASSESSMENT — ANXIETY QUESTIONNAIRES: GAD7 TOTAL SCORE: 0

## 2018-02-03 ASSESSMENT — PATIENT HEALTH QUESTIONNAIRE - PHQ9: SUM OF ALL RESPONSES TO PHQ QUESTIONS 1-9: 0

## 2018-02-04 LAB
B BURGDOR IGG SER QL IB: NEGATIVE
B BURGDOR IGM SER QL IB: POSITIVE

## 2018-03-14 DIAGNOSIS — I45.6 ANOMALOUS ATRIOVENTRICULAR EXCITATION: Primary | ICD-10-CM

## 2018-03-20 ENCOUNTER — HOSPITAL ENCOUNTER (OUTPATIENT)
Dept: CARDIOLOGY | Facility: HOSPITAL | Age: 33
Discharge: HOME | End: 2018-03-20
Attending: INTERNAL MEDICINE | Admitting: INTERNAL MEDICINE
Payer: COMMERCIAL

## 2018-03-20 DIAGNOSIS — I45.6 ANOMALOUS ATRIOVENTRICULAR EXCITATION: ICD-10-CM

## 2018-03-20 PROCEDURE — 93016 CV STRESS TEST SUPVJ ONLY: CPT | Performed by: INTERNAL MEDICINE

## 2018-03-20 PROCEDURE — 93017 CV STRESS TEST TRACING ONLY: CPT

## 2018-03-20 PROCEDURE — 93018 CV STRESS TEST I&R ONLY: CPT | Performed by: INTERNAL MEDICINE

## 2018-03-27 ENCOUNTER — OFFICE VISIT (OUTPATIENT)
Dept: CARDIOLOGY | Facility: OTHER | Age: 33
End: 2018-03-27
Attending: INTERNAL MEDICINE
Payer: COMMERCIAL

## 2018-03-27 VITALS
BODY MASS INDEX: 29.55 KG/M2 | RESPIRATION RATE: 20 BRPM | HEIGHT: 68 IN | SYSTOLIC BLOOD PRESSURE: 138 MMHG | WEIGHT: 195 LBS | OXYGEN SATURATION: 97 % | TEMPERATURE: 98 F | DIASTOLIC BLOOD PRESSURE: 91 MMHG | HEART RATE: 83 BPM

## 2018-03-27 DIAGNOSIS — I45.6 ANOMALOUS ATRIOVENTRICULAR EXCITATION: ICD-10-CM

## 2018-03-27 PROCEDURE — 99213 OFFICE O/P EST LOW 20 MIN: CPT | Performed by: INTERNAL MEDICINE

## 2018-03-27 ASSESSMENT — PAIN SCALES - GENERAL: PAINLEVEL: NO PAIN (0)

## 2018-03-27 NOTE — LETTER
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    Sincerely,        Magnus Elise MD

## 2018-03-27 NOTE — PROGRESS NOTES
Clinical Cardiac Electrophysiology Consultation    Chief Complaint: ventricular pre-excitation    HPI: I was happy to see Mr. Mederos in consultation for the above.     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    He reports no history of palpitations, syncope, near syncope, chest pain/discomfort or any other cardiac symptom.    27Mar2018 Interval history: Mr. Mederos returns for follow-up of his ventricular preexcitation.  He reports he has been doing well.  He has had no symptoms of palpitations.  He has had no syncope or near syncope.  He remains active.  He has had no change in his exercise abilities.  He has no symptoms of angina or symptoms suggestive of heart failure.    He did have an exercise study completed.  I have personally reviewed those tracings.  Unfortunately he does not have abrupt loss of preexcitation.  As his heart rate increases the delta wave becomes less and less apparent.  This cannot be taken as a sign of a poorly functioning pathway.    Current Outpatient Prescriptions   Medication Sig Dispense Refill     ranitidine (ZANTAC) 150 MG tablet Take 1 tablet (150 mg) by mouth 2 times daily (Patient not taking: Reported on 3/27/2018) 60 tablet 1     cetirizine (ZYRTEC) 10 MG tablet Take 10 mg by mouth as needed for allergies       Past Medical History:   Diagnosis Date     Ventricular pre-excitation        Past Surgical History:   Procedure Laterality Date     HERNIA REPAIR       wisdom teeth         Family History   Problem Relation Age of Onset     Other - See Comments Mother      Graves Disease     Arthritis Mother      Rheumatoid arthritis     Arthritis Father      CEREBROVASCULAR DISEASE Paternal Grandfather      DIABETES Maternal Grandmother      Alzheimer Disease Maternal Grandmother      CANCER Other      cousin brain tumor     Congenital Anomalies Other      cousin cystic fibrosis       Social History  "  Substance Use Topics     Smoking status: Never Smoker     Smokeless tobacco: Never Used     Alcohol use 1.5 oz/week     3 Standard drinks or equivalent per week       Allergies   Allergen Reactions     Pollen Extract Other (See Comments)     Sneezing         ROS: comprehensive review is negative    Physical Examination:  Vitals: BP (!) 138/91 (BP Location: Left arm, Patient Position: Chair, Cuff Size: Adult Regular)  Pulse 83  Temp 98  F (36.7  C) (Tympanic)  Resp 20  Ht 1.727 m (5' 8\")  Wt 88.5 kg (195 lb)  SpO2 97%  BMI 29.65 kg/m2  BMI= Body mass index is 29.65 kg/(m^2).    GENERAL APPEARANCE: healthy, alert and no distress  HEENT: no icterus  NECK: JVP is not visible  CHEST: lungs clear to auscultation   CARDIOVASCULAR: regular rhythm, normal S1 with physiologic split S2, no S3 or S4 and no murmur, click or rub, precordium quiet with normal PMI.  EXTREMITIES: no clubbing, cyanosis or edema  NEURO: alert and oriented to person/place/time, normal speech, gait and affect  VASC: warm and well perfused    Laboratory:    As noted in history of present illness I reviewed his tracings from his stress test.  At baseline he has evidence of preexcitation.  The pattern preexcitation suggests a left lateral accessory pathway.  During exercise the delta wave becomes gradually less apparent.  There was no abrupt loss of preexcitation.    Assessment and recommendations:    1) Ventricular pre-excitation -I discussed with him again that he has not had any symptoms suggestive of a supraventricular tachycardia related to his accessory pathway.  The finding of abrupt loss of preexcitation on exercise treadmill it is a very specific finding for low risk of sudden death related to atrial fibrillation with rapid accessory pathway conduction.  However, the finding is very insensitive.  The lack of loss of preexcitation does not indicate high risk.    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  The risk of ventricular " preexcitation is not exercise related.    We did discuss an electrophysiology study.  This can be done as a purely diagnostic study.  If we can demonstrate that the accessory pathway is not capable of rapid antegrade conduction we would not need to proceed to ablation.  However, if the pathway were to be capable of rapid conduction ablation would be reasonable.    He will consider the above at this point he does not feel the need to proceed with electrophysiology study.  If he develops symptoms of palpitations she will call.  Certainly if he has syncope he should let me know about that.    Appreciate chance to help with Mr. Mederos's health care.    Portions of this note were dictated using speech recognition software. The note has been proofread but errors in the text may have been overlooked. Please contact me if there are any concerns regarding the accuracy of the dictation.       Magnus Elise MD

## 2018-03-27 NOTE — NURSING NOTE
"Chief Complaint   Patient presents with     RECHECK     2 year follow-up       Initial BP (!) 138/91 (BP Location: Left arm, Patient Position: Chair, Cuff Size: Adult Regular)  Pulse 83  Temp 98  F (36.7  C) (Tympanic)  Resp 20  Ht 1.727 m (5' 8\")  Wt 88.5 kg (195 lb)  SpO2 97%  BMI 29.65 kg/m2 Estimated body mass index is 29.65 kg/(m^2) as calculated from the following:    Height as of this encounter: 1.727 m (5' 8\").    Weight as of this encounter: 88.5 kg (195 lb).  Medication Reconciliation: complete   Vinita Barragan      "

## 2018-03-27 NOTE — MR AVS SNAPSHOT
After Visit Summary   3/27/2018    Hari Mederos    MRN: [id number]           Patient Information     Date Of Birth          1985        Visit Information        Provider Department      3/27/2018 1:00 PM Magnus Elise MD [facility]        Today's Diagnoses     Anomalous atrioventricular excitation          Care Instructions    You were seen by Dr. Elise, 3/27/2018.     1.  Please continue all medication as they have been prescribed.      2.  You may consider having an electrophysiology study to evaluate your heart rhythm and possible ablation. Please call the cardiology department if you would like to proceed with further workup.     3. If you have an increase in symptoms, passing out, feeling faint or heart racing please call the cardiology department.     You will follow up with Dr. Elise as needed .      Please call the cardiology office with problems, questions, or concerns at [phone].    If you experience chest pain, chest pressure, chest tightness, shortness of breath, fainting, lightheadedness, nausea, vomiting, or other concerning symptoms, please report to the Emergency Department or call 911. These symptoms may be emergent, and best treated in the Emergency Department.       Laura THOMAS RN-BSN  Cardiology   [facility]  [phone]              Follow-ups after your visit        Who to contact     If you have questions or need follow up information about today's clinic visit or your schedule please contact [facility] directly at [phone].  Normal or non-critical lab and imaging results will be communicated to you by MyChart, letter or phone within 4 business days after the clinic has received the results. If you do not hear from us within 7 days, please contact the clinic through MyChart or phone. If you have a critical or abnormal lab result, we will notify you by phone as soon as possible.  Submit refill requests  "through [employer] or call your pharmacy and they will forward the refill request to us. Please allow 3 business days for your refill to be completed.          Additional Information About Your Visit        Authore[employer]art Information     [employer] gives you secure access to your electronic health record. If you see a primary care provider, you can also send messages to your care team and make appointments. If you have questions, please call your primary care clinic.  If you do not have a primary care provider, please call [phone] and they will assist you.        Care EveryWhere ID     This is your Care EveryWhere ID. This could be used by other organizations to access your [place] medical records  [id number]        Your Vitals Were     Pulse Temperature Respirations Height Pulse Oximetry BMI (Body Mass Index)    83 98  F (36.7  C) (Tympanic) 20 1.727 m (5' 8\") 97% 29.65 kg/m2       Blood Pressure from Last 3 Encounters:   03/27/18 (!) 138/91   02/02/18 118/78   01/25/18 146/92    Weight from Last 3 Encounters:   03/27/18 88.5 kg (195 lb)   02/02/18 83.9 kg (185 lb)   07/06/17 86.2 kg (190 lb)              Today, you had the following     No orders found for display       Primary Care Provider Office Phone # Fax #    Nahum Barbosa [phone]       [facility] [address] [address]        Equal Access to Services     TRISH JOHNSON AH: Hadii aad ku hadasho Soomaali, waaxda luqadaha, qaybta kaalmada adeegyada, cl vale adetres hutchins . So [facility] [phone].    ATENCIÓN: Si habla español, tiene a ahumada disposición servicios gratuitos de asistencia lingüística. Llluis miguel al [phone].    We comply with applicable federal civil rights laws and [place] laws. We do not discriminate on the basis of race, color, national origin, age, disability, sex, sexual orientation, or gender identity.            Thank you!     Thank you for choosing [facility]  for " your care. Our goal is always to provide you with excellent care. Hearing back from our patients is one way we can continue to improve our services. Please take a few minutes to complete the written survey that you may receive in the mail after your visit with us. Thank you!             Your Updated Medication List - Protect others around you: Learn how to safely use, store and throw away your medicines at www.disposemymeds.org.          This list is accurate as of 3/27/18  1:47 PM.  Always use your most recent med list.                   Brand Name Dispense Instructions for use Diagnosis    cetirizine 10 MG tablet    zyrTEC     Take 10 mg by mouth as needed for allergies        ranitidine 150 MG tablet    ZANTAC    60 tablet    Take 1 tablet (150 mg) by mouth 2 times daily    Abdominal pain, generalized

## 2018-03-27 NOTE — PATIENT INSTRUCTIONS
You were seen by Dr. Elise, 3/27/2018.     1.  Please continue all medication as they have been prescribed.      2.  You may consider having an electrophysiology study to evaluate your heart rhythm and possible ablation. Please call the cardiology department if you would like to proceed with further workup.     3. If you have an increase in symptoms, passing out, feeling faint or heart racing please call the cardiology department.     You will follow up with Dr. Elise as needed .      Please call the cardiology office with problems, questions, or concerns at [phone].    If you experience chest pain, chest pressure, chest tightness, shortness of breath, fainting, lightheadedness, nausea, vomiting, or other concerning symptoms, please report to the Emergency Department or call 911. These symptoms may be emergent, and best treated in the Emergency Department.       Laura THOMAS RN-BSN  Cardiology   [facility]  [phone]

## 2018-12-27 ENCOUNTER — APPOINTMENT (OUTPATIENT)
Dept: CHIROPRACTIC MEDICINE | Facility: OTHER | Age: 33
End: 2018-12-27

## 2018-12-27 ENCOUNTER — APPOINTMENT (OUTPATIENT)
Dept: OCCUPATIONAL MEDICINE | Facility: OTHER | Age: 33
End: 2018-12-27

## 2018-12-27 PROCEDURE — 99499 UNLISTED E&M SERVICE: CPT

## 2020-03-02 ENCOUNTER — HEALTH MAINTENANCE LETTER (OUTPATIENT)
Age: 35
End: 2020-03-02

## 2020-12-19 DIAGNOSIS — Z20.7 EXPOSURE TO SCABIES: Primary | ICD-10-CM

## 2020-12-19 RX ORDER — PERMETHRIN 50 MG/G
CREAM TOPICAL
Qty: 60 G | Refills: 1 | Status: SHIPPED | OUTPATIENT
Start: 2020-12-19

## 2020-12-20 ENCOUNTER — HEALTH MAINTENANCE LETTER (OUTPATIENT)
Age: 35
End: 2020-12-20

## 2021-02-15 ENCOUNTER — APPOINTMENT (OUTPATIENT)
Dept: OCCUPATIONAL MEDICINE | Facility: OTHER | Age: 36
End: 2021-02-15

## 2021-02-15 ENCOUNTER — APPOINTMENT (OUTPATIENT)
Dept: CHIROPRACTIC MEDICINE | Facility: OTHER | Age: 36
End: 2021-02-15

## 2021-02-15 PROCEDURE — 99499 UNLISTED E&M SERVICE: CPT

## 2021-04-18 ENCOUNTER — HEALTH MAINTENANCE LETTER (OUTPATIENT)
Age: 36
End: 2021-04-18

## 2021-10-03 ENCOUNTER — HEALTH MAINTENANCE LETTER (OUTPATIENT)
Age: 36
End: 2021-10-03

## 2021-11-08 ENCOUNTER — NURSE TRIAGE (OUTPATIENT)
Dept: INTERNAL MEDICINE | Facility: OTHER | Age: 36
End: 2021-11-08
Payer: COMMERCIAL

## 2021-11-08 ENCOUNTER — OFFICE VISIT (OUTPATIENT)
Dept: FAMILY MEDICINE | Facility: OTHER | Age: 36
End: 2021-11-08
Attending: INTERNAL MEDICINE
Payer: COMMERCIAL

## 2021-11-08 DIAGNOSIS — Z20.822 SUSPECTED 2019 NOVEL CORONAVIRUS INFECTION: ICD-10-CM

## 2021-11-08 DIAGNOSIS — Z20.822 SUSPECTED 2019 NOVEL CORONAVIRUS INFECTION: Primary | ICD-10-CM

## 2021-11-08 PROCEDURE — U0003 INFECTIOUS AGENT DETECTION BY NUCLEIC ACID (DNA OR RNA); SEVERE ACUTE RESPIRATORY SYNDROME CORONAVIRUS 2 (SARS-COV-2) (CORONAVIRUS DISEASE [COVID-19]), AMPLIFIED PROBE TECHNIQUE, MAKING USE OF HIGH THROUGHPUT TECHNOLOGIES AS DESCRIBED BY CMS-2020-01-R: HCPCS

## 2021-11-08 PROCEDURE — U0005 INFEC AGEN DETEC AMPLI PROBE: HCPCS

## 2021-11-08 NOTE — TELEPHONE ENCOUNTER
COVID 19 Nurse Triage Plan/Patient Instructions    Please be aware that novel coronavirus (COVID-19) may be circulating in the community. If you develop symptoms such as fever, cough, or SOB or if you have concerns about the presence of another infection including coronavirus (COVID-19), please contact your health care provider or visit [email or web address].     Disposition/Instructions    Home care recommended. Follow home care protocol based instructions.    Thank you for taking steps to prevent the spread of this virus.  o Limit your contact with others.  o Wear a simple mask to cover your cough.  o Wash your hands well and often.    Resources    [facility]: About COVID-19: [email or web address]    CDC: What to Do If You're Sick: www.cdc.gov/coronavirus/2019-ncov/about/steps-when-sick.html    CDC: Ending Home Isolation: www.cdc.gov/coronavirus/2019-ncov/hcp/disposition-in-home-patients.html     CDC: Caring for Someone: www.cdc.gov/coronavirus/2019-ncov/if-you-are-sick/care-for-someone.html     [facility]: Interim Guidance for Hospital Discharge to Home: www.health.[email or web address].[place].us/diseases/coronavirus/hcp/hospdischarge.pdf    [facility] clinical trials (COVID-19 research studies): [email or web address]-clinical-trials     Below are the COVID-19 hotlines at the [place] Department of Health ([facility]). Interpreters are available.   o For health questions: Call 204-969-6894 or 1-777.166.7470 (7 a.m. to 7 p.m.)  o For questions about schools and childcare: Call 740-432-2014 or 1-604.487.7151 (7 a.m. to 7 p.m.)                   Reason for Disposition    COVID-19 Testing, questions about    COVID-19 Home Isolation, questions about    Additional Information    Negative: SEVERE difficulty breathing (e.g., struggling for each breath, speaks in single words)    Negative: Difficult to awaken or acting confused (e.g., disoriented, slurred speech)    Negative: Bluish (or gray) lips or face  now    Negative: Shock suspected (e.g., cold/pale/clammy skin, too weak to stand, low BP, rapid pulse)    Negative: Sounds like a life-threatening emergency to the triager    Negative: [1] COVID-19 exposure AND [2] no symptoms    Negative: COVID-19 vaccine reaction suspected (e.g., fever, headache, muscle aches) occurring 1 to 3 days after getting vaccine    Negative: COVID-19 vaccine, questions about    Negative: [1] Lives with someone known to have influenza (flu test positive) AND [2] flu-like symptoms (e.g., cough, runny nose, sore throat, SOB; with or without fever)    Negative: [1] Adult with possible COVID-19 symptoms AND [2] triager concerned about severity of symptoms or other causes    Negative: COVID-19 and breastfeeding, questions about    Negative: SEVERE or constant chest pain or pressure (Exception: mild central chest pain, present only when coughing)    Negative: MODERATE difficulty breathing (e.g., speaks in phrases, SOB even at rest, pulse 100-120)    Negative: [1] Headache AND [2] stiff neck (can't touch chin to chest)    Negative: MILD difficulty breathing (e.g., minimal/no SOB at rest, SOB with walking, pulse <100)    Negative: Chest pain or pressure    Negative: Patient sounds very sick or weak to the triager    Negative: Fever > 103 F (39.4 C)    Negative: [1] Fever > 101 F (38.3 C) AND [2] age > 60 years    Negative: [1] Fever > 100.0 F (37.8 C) AND [2] bedridden (e.g., nursing home patient, CVA, chronic illness, recovering from surgery)    Negative: HIGH RISK for severe COVID complications (e.g., age > 64 years, obesity with BMI > 25, pregnant, chronic lung disease or other chronic medical condition)  (Exception: Already seen by PCP and no new or worsening symptoms.)    Negative: [1] HIGH RISK patient AND [2] influenza is widespread in the community AND [3] ONE OR MORE respiratory symptoms: cough, sore throat, runny or stuffy nose    Negative: [1] HIGH RISK patient AND [2] influenza exposure  "within the last 7 days AND [3] ONE OR MORE respiratory symptoms: cough, sore throat, runny or stuffy nose    Negative: [1] COVID-19 infection suspected by caller or triager AND [2] mild symptoms (cough, fever, or others) AND [3] negative COVID-19 rapid test    Negative: Fever present > 3 days (72 hours)    Negative: [1] Fever returns after gone for over 24 hours AND [2] symptoms worse or not improved    Negative: [1] Continuous (nonstop) coughing interferes with work or school AND [2] no improvement using cough treatment per protocol    Negative: Cough present > 3 weeks    Answer Assessment - Initial Assessment Questions  1. COVID-19 DIAGNOSIS: \"Who made your Coronavirus (COVID-19) diagnosis?\" \"Was it confirmed by a positive lab test?\" If not diagnosed by a HCP, ask \"Are there lots of cases (community spread) where you live?\" (See public health department website, if unsure)    no  2. COVID-19 EXPOSURE: \"Was there any known exposure to COVID before the symptoms began?\" CDC Definition of close contact: within 6 feet (2 meters) for a total of 15 minutes or more over a 24-hour period.       no  3. ONSET: \"When did the COVID-19 symptoms start?\"       11/5/21  4. WORST SYMPTOM: \"What is your worst symptom?\" (e.g., cough, fever, shortness of breath, muscle aches)      cough  5. COUGH: \"Do you have a cough?\" If Yes, ask: \"How bad is the cough?\"        Severe cough at night wet  6. FEVER: \"Do you have a fever?\" If Yes, ask: \"What is your temperature, how was it measured, and when did it start?\"      102.5 this .2  7. RESPIRATORY STATUS: \"Describe your breathing?\" (e.g., shortness of breath, wheezing, unable to speak)       no  8. BETTER-SAME-WORSE: \"Are you getting better, staying the same or getting worse compared to yesterday?\"  If getting worse, ask, \"In what way?\"      same  9. HIGH RISK DISEASE: \"Do you have any chronic medical problems?\" (e.g., asthma, heart or lung disease, weak immune system, obesity, etc.)    " "  no  10. PREGNANCY: \"Is there any chance you are pregnant?\" \"When was your last menstrual period?\"        n/a  11. OTHER SYMPTOMS: \"Do you have any other symptoms?\"  (e.g., chills, fatigue, headache, loss of smell or taste, muscle pain, sore throat; new loss of smell or taste especially support the diagnosis of COVID-19)        Body aches    Protocols used: CORONAVIRUS (COVID-19) DIAGNOSED OR [id number] 8.25.2021    "

## 2021-11-09 LAB — SARS-COV-2 RNA RESP QL NAA+PROBE: POSITIVE

## 2021-11-14 ENCOUNTER — APPOINTMENT (OUTPATIENT)
Dept: GENERAL RADIOLOGY | Facility: HOSPITAL | Age: 36
End: 2021-11-14
Attending: PHYSICIAN ASSISTANT
Payer: COMMERCIAL

## 2021-11-14 ENCOUNTER — HOSPITAL ENCOUNTER (EMERGENCY)
Facility: HOSPITAL | Age: 36
Discharge: HOME | End: 2021-11-14
Attending: PHYSICIAN ASSISTANT | Admitting: PHYSICIAN ASSISTANT
Payer: COMMERCIAL

## 2021-11-14 VITALS
HEART RATE: 101 BPM | TEMPERATURE: 98.6 F | OXYGEN SATURATION: 97 % | BODY MASS INDEX: 30.17 KG/M2 | DIASTOLIC BLOOD PRESSURE: 93 MMHG | WEIGHT: 198.41 LBS | RESPIRATION RATE: 18 BRPM | SYSTOLIC BLOOD PRESSURE: 135 MMHG

## 2021-11-14 DIAGNOSIS — U07.1 PNEUMONIA DUE TO 2019 NOVEL CORONAVIRUS: ICD-10-CM

## 2021-11-14 DIAGNOSIS — J12.82 PNEUMONIA DUE TO 2019 NOVEL CORONAVIRUS: ICD-10-CM

## 2021-11-14 DIAGNOSIS — J18.9 MULTIFOCAL PNEUMONIA: ICD-10-CM

## 2021-11-14 PROCEDURE — 99214 OFFICE O/P EST MOD 30 MIN: CPT | Performed by: PHYSICIAN ASSISTANT

## 2021-11-14 PROCEDURE — 71045 X-RAY EXAM CHEST 1 VIEW: CPT

## 2021-11-14 PROCEDURE — 250N000013 HC RX MED GY IP 250 OP 250 PS 637: Performed by: PHYSICIAN ASSISTANT

## 2021-11-14 PROCEDURE — G0463 HOSPITAL OUTPT CLINIC VISIT: HCPCS | Mod: 25

## 2021-11-14 RX ORDER — DEXAMETHASONE 6 MG/1
6 TABLET ORAL DAILY
Qty: 10 TABLET | Refills: 0 | Status: SHIPPED | OUTPATIENT
Start: 2021-11-14 | End: 2023-02-04

## 2021-11-14 RX ORDER — LEVOFLOXACIN 750 MG/1
750 TABLET, FILM COATED ORAL DAILY
Qty: 5 TABLET | Refills: 0 | Status: SHIPPED | OUTPATIENT
Start: 2021-11-14 | End: 2021-11-19

## 2021-11-14 RX ORDER — BENZONATATE 100 MG/1
200 CAPSULE ORAL 3 TIMES DAILY PRN
Status: DISCONTINUED | OUTPATIENT
Start: 2021-11-14 | End: 2021-11-14 | Stop reason: HOSPADM

## 2021-11-14 RX ORDER — BENZONATATE 200 MG/1
200 CAPSULE ORAL 3 TIMES DAILY PRN
Qty: 21 CAPSULE | Refills: 0 | Status: SHIPPED | OUTPATIENT
Start: 2021-11-14 | End: 2021-11-21

## 2021-11-14 RX ADMIN — BENZONATATE 200 MG: 100 CAPSULE ORAL at 13:38

## 2021-11-14 ASSESSMENT — ENCOUNTER SYMPTOMS: COUGH: 1

## 2021-11-14 NOTE — ED TRIAGE NOTES
35 y/o male presents with reports of a cough. Patient is known COVID positive. Symptoms started on November 5th. Patgient states his wife is requesting a chest xray. SpO2 stable throughout triage at 97% on room air.

## 2021-11-14 NOTE — ED PROVIDER NOTES
History     Chief Complaint   Patient presents with     Cough     known COVID +     HPI  Hari Mederos is a 36 year old male who presents to urgent care for reevaluation of cough.  Patient was diagnosed with COVID-19 on 11 5 and states that his wife is requesting a chest x-ray because he has been coughing a lot.  He denies feeling short of breath currently, no fevers, or any other associated symptoms.  Patient states he has been taking ibuprofen for his cough.    Allergies:  Allergies   Allergen Reactions     Pollen Extract Other (See Comments)     Sneezing       Problem List:    Patient Active Problem List    Diagnosis Date Noted     Lyme disease 07/27/2017     Priority: Medium     treated          Past Medical History:    Past Medical History:   Diagnosis Date     Ventricular pre-excitation        Past Surgical History:    Past Surgical History:   Procedure Laterality Date     HERNIA REPAIR       wisdom teeth         Family History:    Family History   Problem Relation Age of Onset     Other - See Comments Mother         Graves Disease     Arthritis Mother         Rheumatoid arthritis     Arthritis Father      Cerebrovascular Disease Paternal Grandfather      Diabetes Maternal Grandmother      Alzheimer Disease Maternal Grandmother      Cancer Other         cousin brain tumor     Congenital Anomalies Other         cousin cystic fibrosis       Social History:  Marital Status:   [2]  Social History     Tobacco Use     Smoking status: Never Smoker     Smokeless tobacco: Never Used   Substance Use Topics     Alcohol use: Yes     Alcohol/week: 2.5 standard drinks     Types: 3 Standard drinks or equivalent per week     Drug use: Not on file        Medications:    benzonatate (TESSALON) 200 MG capsule  dexamethasone (DECADRON) 6 MG tablet  ipratropium-albuterol (COMBIVENT RESPIMAT)  MCG/ACT inhaler  levofloxacin (LEVAQUIN) 750 MG tablet  cetirizine (ZYRTEC) 10 MG tablet  permethrin (ELIMITE) 5 %  external cream  ranitidine (ZANTAC) 150 MG tablet          Review of Systems   Respiratory: Positive for cough.    All other systems reviewed and are negative.      Physical Exam   BP: 135/93  Pulse: 101  Temp: 98.6  F (37  C)  Resp: 18  Weight: 90 kg (198 lb 6.6 oz)  SpO2: 97 %      Physical Exam  Vitals and nursing note reviewed.   Constitutional:       Appearance: Normal appearance. He is not ill-appearing.   Cardiovascular:      Rate and Rhythm: Regular rhythm.      Heart sounds: Normal heart sounds.   Pulmonary:      Effort: Pulmonary effort is normal.      Breath sounds: Normal breath sounds.   Neurological:      Mental Status: He is oriented to person, place, and time.         ED Course        Procedures             Critical Care time:               No results found for this or any previous visit (from the past 24 hour(s)).    Medications   benzonatate (TESSALON) capsule 200 mg (200 mg Oral Given 11/14/21 1338)       Assessments & Plan (with Medical Decision Making)   #1.  COVID-19 associated pneumonia    Discussed exam findings as well as x-ray reading with patient.  X-ray showed findings concerning for multifocal pneumonia.  Patient is prescribed Levaquin, dexamethasone, Tessalon, Combivent inhaler.  If patient has increased shortness of breath he should return to emergency department immediately.  Patient verbalized understanding and agreement of plan.          I have reviewed the nursing notes.    I have reviewed the findings, diagnosis, plan and need for follow up with the patient.    New Prescriptions    BENZONATATE (TESSALON) 200 MG CAPSULE    Take 1 capsule (200 mg) by mouth 3 times daily as needed for cough    DEXAMETHASONE (DECADRON) 6 MG TABLET    Take 1 tablet (6 mg) by mouth daily for 10 days    IPRATROPIUM-ALBUTEROL (COMBIVENT RESPIMAT)  MCG/ACT INHALER    Inhale 1 puff into the lungs 4 times daily    LEVOFLOXACIN (LEVAQUIN) 750 MG TABLET    Take 1 tablet (750 mg) by mouth daily for 5 days        Final diagnoses:   Multifocal pneumonia   Pneumonia due to 2019 novel coronavirus       11/14/2021   [facility]     Jose Miller PA-C  11/14/21 1400

## 2022-05-14 ENCOUNTER — HEALTH MAINTENANCE LETTER (OUTPATIENT)
Age: 37
End: 2022-05-14

## 2022-09-04 ENCOUNTER — HEALTH MAINTENANCE LETTER (OUTPATIENT)
Age: 37
End: 2022-09-04

## 2023-02-04 ENCOUNTER — HOSPITAL ENCOUNTER (EMERGENCY)
Facility: HOSPITAL | Age: 38
Discharge: HOME | End: 2023-02-04
Attending: NURSE PRACTITIONER | Admitting: NURSE PRACTITIONER
Payer: COMMERCIAL

## 2023-02-04 ENCOUNTER — APPOINTMENT (OUTPATIENT)
Dept: GENERAL RADIOLOGY | Facility: HOSPITAL | Age: 38
End: 2023-02-04
Attending: NURSE PRACTITIONER
Payer: COMMERCIAL

## 2023-02-04 VITALS
OXYGEN SATURATION: 98 % | DIASTOLIC BLOOD PRESSURE: 92 MMHG | RESPIRATION RATE: 18 BRPM | TEMPERATURE: 97.4 F | HEART RATE: 94 BPM | SYSTOLIC BLOOD PRESSURE: 134 MMHG

## 2023-02-04 DIAGNOSIS — J18.9 PNEUMONIA: ICD-10-CM

## 2023-02-04 DIAGNOSIS — J18.9 PNEUMONIA DUE TO INFECTIOUS ORGANISM, UNSPECIFIED LATERALITY, UNSPECIFIED PART OF LUNG: ICD-10-CM

## 2023-02-04 LAB
FLUAV RNA SPEC QL NAA+PROBE: NEGATIVE
FLUBV RNA RESP QL NAA+PROBE: NEGATIVE
RSV RNA SPEC NAA+PROBE: NEGATIVE
SARS-COV-2 RNA RESP QL NAA+PROBE: NEGATIVE

## 2023-02-04 PROCEDURE — C9803 HOPD COVID-19 SPEC COLLECT: HCPCS

## 2023-02-04 PROCEDURE — 71046 X-RAY EXAM CHEST 2 VIEWS: CPT

## 2023-02-04 PROCEDURE — G0463 HOSPITAL OUTPT CLINIC VISIT: HCPCS | Mod: 25

## 2023-02-04 PROCEDURE — 99214 OFFICE O/P EST MOD 30 MIN: CPT | Mod: CS | Performed by: NURSE PRACTITIONER

## 2023-02-04 PROCEDURE — 87637 SARSCOV2&INF A&B&RSV AMP PRB: CPT | Performed by: NURSE PRACTITIONER

## 2023-02-04 RX ORDER — AZITHROMYCIN 250 MG/1
TABLET, FILM COATED ORAL
Qty: 6 TABLET | Refills: 0 | Status: SHIPPED | OUTPATIENT
Start: 2023-02-04 | End: 2023-02-09

## 2023-02-04 RX ORDER — INHALER, ASSIST DEVICES
SPACER (EA) MISCELLANEOUS
Qty: 1 EACH | Refills: 0 | Status: SHIPPED | OUTPATIENT
Start: 2023-02-04

## 2023-02-04 RX ORDER — ALBUTEROL SULFATE 90 UG/1
2 AEROSOL, METERED RESPIRATORY (INHALATION) EVERY 6 HOURS PRN
Qty: 18 G | Refills: 0 | Status: SHIPPED | OUTPATIENT
Start: 2023-02-04

## 2023-02-04 ASSESSMENT — ENCOUNTER SYMPTOMS
VOMITING: 0
MYALGIAS: 0
CHILLS: 0
COUGH: 1
SINUS PRESSURE: 1
HEADACHES: 1
SHORTNESS OF BREATH: 0
DIARRHEA: 0
NAUSEA: 1
SINUS PAIN: 1
ACTIVITY CHANGE: 1
SORE THROAT: 0
APPETITE CHANGE: 0
FEVER: 0
RHINORRHEA: 0
FATIGUE: 1

## 2023-02-04 NOTE — ED PROVIDER NOTES
History     Chief Complaint   Patient presents with     Cough     HPI  Hari Mederos is a 37 year old male who started developing symptoms of fatigue, sinus pain and pressure, cough, nausea, and a headache last evening.  Took allergy medication this afternoon did help decrease his symptoms slightly.  Concerned because the last time he had a cough he had pneumonia.  No known sick contacts.  Non-smoker.  Has not had COVID or influenza vaccine.  Denies chills, fever, vomiting, diarrhea, and shortness of breath    Allergies:  Allergies   Allergen Reactions     Pollen Extract Other (See Comments)     Sneezing       Problem List:    Patient Active Problem List    Diagnosis Date Noted     Lyme disease 07/27/2017     Priority: Medium     treated          Past Medical History:    Past Medical History:   Diagnosis Date     Ventricular pre-excitation        Past Surgical History:    Past Surgical History:   Procedure Laterality Date     HERNIA REPAIR       wisdom teeth         Family History:    Family History   Problem Relation Age of Onset     Other - See Comments Mother         Graves Disease     Arthritis Mother         Rheumatoid arthritis     Arthritis Father      Cerebrovascular Disease Paternal Grandfather      Diabetes Maternal Grandmother      Alzheimer Disease Maternal Grandmother      Cancer Other         cousin brain tumor     Congenital Anomalies Other         cousin cystic fibrosis       Social History:  Marital Status:   [2]  Social History     Tobacco Use     Smoking status: Never     Smokeless tobacco: Never   Substance Use Topics     Alcohol use: Yes     Alcohol/week: 2.5 standard drinks     Types: 3 Standard drinks or equivalent per week        Medications:    albuterol (PROAIR HFA/PROVENTIL HFA/VENTOLIN HFA) 108 (90 Base) MCG/ACT inhaler  azithromycin (ZITHROMAX) 250 MG tablet  cetirizine (ZYRTEC) 10 MG tablet  permethrin (ELIMITE) 5 % external cream  ranitidine (ZANTAC) 150 MG tablet  spacer  (OPTICHAMBER TAE) [facility]          Review of Systems   Constitutional: Positive for activity change and fatigue. Negative for appetite change, chills and fever.   HENT: Positive for sinus pressure and sinus pain. Negative for ear pain, rhinorrhea and sore throat.    Eyes:        Eyes  Feel mildly heavy   Respiratory: Positive for cough. Negative for shortness of breath.    Gastrointestinal: Positive for nausea. Negative for diarrhea and vomiting.   Genitourinary: Negative.    Musculoskeletal: Negative for myalgias.   Skin: Negative.    Neurological: Positive for headaches.       Physical Exam   BP: 134/92  Pulse: 94  Temp: 97.4  F (36.3  C)  Resp: 18  SpO2: 98 %      Physical Exam  Vitals and nursing note reviewed.   Constitutional:       General: He is not in acute distress.     Appearance: He is normal weight.   HENT:      Head: Normocephalic.      Right Ear: Tympanic membrane and ear canal normal.      Left Ear: Tympanic membrane and ear canal normal.      Nose: Nose normal.      Right Sinus: No maxillary sinus tenderness or frontal sinus tenderness.      Left Sinus: No maxillary sinus tenderness or frontal sinus tenderness.      Mouth/Throat:      Lips: Pink.      Mouth: Mucous membranes are moist.      Pharynx: Uvula midline. No posterior oropharyngeal erythema.   Eyes:      Conjunctiva/sclera: Conjunctivae normal.   Cardiovascular:      Rate and Rhythm: Normal rate and regular rhythm.      Heart sounds: Normal heart sounds. No murmur heard.  Pulmonary:      Effort: Pulmonary effort is normal. No respiratory distress.      Breath sounds: Normal air entry. Examination of the right-lower field reveals rales. Rales present. No wheezing.   Lymphadenopathy:      Cervical: No cervical adenopathy.   Skin:     General: Skin is warm and dry.   Neurological:      Mental Status: He is alert and oriented to person, place, and time.   Psychiatric:         Behavior: Behavior normal.         ED Course                  Procedures           Results for orders placed or performed during the hospital encounter of 02/04/23 (from the past 24 hour(s))   Symptomatic Influenza A/B & SARS-CoV2 (COVID-19) Virus PCR Multiplex Nasopharyngeal    Specimen: Nasopharyngeal; Swab   Result Value Ref Range    Influenza A PCR Negative Negative    Influenza B PCR Negative Negative    RSV PCR Negative Negative    SARS CoV2 PCR Negative Negative    Narrative    Testing was performed using the Xpert Xpress CoV2/Flu/RSV Assay on the [employer] Instrument. This test should be ordered for the detection of SARS-CoV-2 and influenza viruses in individuals who meet clinical and/or epidemiological criteria. Test performance is unknown in asymptomatic patients. This test is for in vitro diagnostic use under the FDA EUA for laboratories certified under CLIA to perform high or moderate complexity testing. This test has not been FDA cleared or approved. A negative result does not rule out the presence of PCR inhibitors in the specimen or target RNA in concentration below the limit of detection for the assay. If only one viral target is positive but coinfection with multiple targets is suspected, the sample should be re-tested with another FDA cleared, approved, or authorized test, if coinfection would change clinical management. This test was validated by the [facility] [facility]. These laboratories are certified under the Clinical Laboratory Improvement Amendments of 1988 (CLIA-88) as qualified to perform high complexity laboratory testing.   Chest XR,  PA & LAT    Narrative    PROCEDURE:  XR CHEST 2 VIEWS    HISTORY: crackles right base, cough    COMPARISON:  Chest radiograph 11/14/2021, 1/25/2018    FINDINGS: PA and lateral chest radiographs  Cardiomediastinal silhouette is within normal limits.  No focal consolidation, effusion or pneumothorax.  A few mixed right  lower lobe opacities.  No suspicious osseous lesion or subdiaphragmatic  free air.      Impression    IMPRESSION:    A few mixed right lower lobe opacities, atelectasis or infection.    RAJIV HYDE MD         SYSTEM ID:  [id number]       Medications - No data to display    Assessments & Plan (with Medical Decision Making)     I have reviewed the nursing notes.    I have reviewed the findings, diagnosis, plan and need for follow up with the patient.  (J18.9) Pneumonia  Comment: 37 year old male who started developing symptoms of fatigue, sinus pain and pressure, cough, nausea, and a headache last evening.  Took allergy medication this afternoon did help decrease his symptoms slightly.  Concerned because the last time he had a cough he had pneumonia.  No known sick contacts.  Non-smoker.  Has not had COVID or influenza vaccine.  Denies chills, fever, vomiting, diarrhea, and shortness of breath    MDM: NHT. Lungs CTA with the exception of crackles heard right lower lobe  Multiplex nasopharyngeal swab test results negative    Chest x-ray reviewed and per radiology;  A few mixed right lower lobe opacities, atelectasis or infection.    Plan: Albuterol inhaler and azithromycin Z-Dawson.  Education provided and/or discussed for this/these medications and pneumonia.  Incentive spirometer sent home with him per his request    Treat symptoms conservatively with acetaminophen and  ibuprofen (if applicable) for fevers, body aches, and headaches, guaifenesin and/or honey for cough. May use chest rubs for sore throat and congestion, hot and cold liquids may help decrease sore throat and help you feel better. Increase fluids. You may utilize pseudoephedrine for congestion. Return to be reevaluated by ER/UC or your primary care provider if symptoms worsen, you develop breathing difficulties, or you do not improve in a reasonable time frame. It can take several days for a cough to resolve. It can take ten to fourteen days for upper respiratory symptoms to resolve.   These discharge instructions and  medications were reviewed with him and understanding verbalized.    This document was prepared using a combination of typing and voice generated software.  While every attempt was made for accuracy, spelling and grammatical errors may exist.    Medical Decision Making  The patient's presentation is strongly suggestive of an acute and uncomplicated illness or injury.    The patient's evaluation involved:  ordering and/or review of 1 test(s) in this encounter (see separate area of note for details)    The patient's management involved prescription drug management (including medications given in the ED).    Discharge Medication List as of 2/4/2023  2:53 PM      START taking these medications    Details   albuterol (PROAIR HFA/PROVENTIL HFA/VENTOLIN HFA) 108 (90 Base) MCG/ACT inhaler Inhale 2 puffs into the lungs every 6 hours as needed for shortness of breath, wheezing or cough, Disp-18 g, R-0, E-PrescribePharmacy may dispense brand covered by insurance (Proair, or proventil or ventolin or generic albuterol inhaler)      azithromycin (ZITHROMAX) 250 MG tablet Take 2 tablets (500 mg) by mouth daily for 1 day, THEN 1 tablet (250 mg) daily for 4 days., Disp-6 tablet, R-0, E-Prescribe      spacer (OPTICHAMBER TAE) holding chamber Use with inhaler, Disp-1 each, R-0, E-Prescribe             Final diagnoses:   Pneumonia       2/4/2023   [facility]       Maddy Jeter, CNP  02/04/23 7497

## 2023-02-04 NOTE — ED TRIAGE NOTES
Woke up with bad cough this morning, gets worse when laying down, productive (yellow), mild nausea, has a headache 1/10, no sick exposure that he knows of, denies fever, chills, vomiting, sore throat,or diarrhea. Therapies tried; flonase at noonish. Hx of pnemonia    Jessa Behrman, LPN

## 2023-02-04 NOTE — DISCHARGE INSTRUCTIONS
Increase oral intake, cool mist vaporizer as needed, rest, avoid sharing utensils, practice good hand washing techniques, cover mouth when you cough and sneeze. Throw toothbursh away 24 hours after starting antibiotics.  Over the counter medications such as ibuprofen and/or acetaminophen for fever and generalized aches and pains. Ibuprofen 400 to 800 mg (2 - 4 tabs of over the counter med) every six to eight hours as needed;not to exceed maximum amount of 3200 mg in 24 hours.Tylenol 650 to 1000 mg every four to six hours as needed (not to exceed more than 4000 mg in a 24 hour period). May use interchangeably. Mucinex (guaifenesin) for cough. Chest rubs such as Hector's or Mentholatum may help reduce sore throat symptoms.  Chloraseptic spray for sore throat or menthol lozenges may be helpful for sore throat. Be reevaluated if symptoms persist longer than 10 - 14 days or worsen and if there is no improvement in 72 hours or worsening of symptoms.  Increase fluids. Complete all antibiotics even if feeling better. Taking antibiotics with food may decrease the stomach upset that can occur when taking antibiotics. Antibiotics frequently cause diarrhea. Probiotics or yogurt may help prevent or decrease these symptoms.     OTC decongestants (oral or topical).  Decongestants (oral or topical) cause vasoconstriction of the nasal mucosa.  We prefer oral pseudoephedrine to phenylephrine and other oral OTC nasal decongestants. Side effects of oral decongestants may include tachycardia, elevated diastolic blood pressure, and palpitations. Pseudoephedrine 30 to 60 mg every four to six hours as needed for congestion. (Maximum dose is 240 mg in 24 hours). Do not use longer than 72 hours.    Fluids, herbs, and foods for sore throat relief -- Adjusting the temperature and texture of foods and beverages may provide local relief of sore throat pain. While data showing benefit are quite limited, these approaches are intuitive. We  typically advise these measures since they are likely to be safe with minimal adverse effect, and patients often describe relief of symptoms.  We suggest hydration with frozen (eg, ice or popsicles) or heated liquids (eg, teas, soups), rather than room temperature or refrigerated fluids in patient with significant sore throat pain. Very cold foods can have a numbing-like effect that temporarily reduces or alleviates the pain of swallowing. Ice cubes or frozen popsicles facilitate hydration; ice cream and frozen yogurt provide caloric intake.  Warm fluids and foods, including teas, soups, and soft non-irritating foods, may be better tolerated by patients with throat pain than irritating foods (eg, rough-textured or spicy foods) or fluids at room temperatures. Foods that coat the throat, including honey and hard candies, can facilitate intake of calories while temporarily relieving throat pain.

## 2023-02-15 ENCOUNTER — APPOINTMENT (OUTPATIENT)
Dept: CHIROPRACTIC MEDICINE | Facility: OTHER | Age: 38
End: 2023-02-15

## 2023-02-15 ENCOUNTER — APPOINTMENT (OUTPATIENT)
Dept: OCCUPATIONAL MEDICINE | Facility: OTHER | Age: 38
End: 2023-02-15

## 2023-02-15 PROCEDURE — 99499 UNLISTED E&M SERVICE: CPT

## 2023-06-03 ENCOUNTER — HEALTH MAINTENANCE LETTER (OUTPATIENT)
Age: 38
End: 2023-06-03

## 2024-05-13 ENCOUNTER — OFFICE VISIT (OUTPATIENT)
Dept: PODIATRY | Facility: OTHER | Age: 39
End: 2024-05-13
Attending: PODIATRIST
Payer: COMMERCIAL

## 2024-05-13 ENCOUNTER — TELEPHONE (OUTPATIENT)
Dept: PODIATRY | Facility: OTHER | Age: 39
End: 2024-05-13

## 2024-05-13 ENCOUNTER — ANCILLARY PROCEDURE (OUTPATIENT)
Dept: GENERAL RADIOLOGY | Facility: OTHER | Age: 39
End: 2024-05-13
Attending: PODIATRIST
Payer: COMMERCIAL

## 2024-05-13 VITALS
SYSTOLIC BLOOD PRESSURE: 147 MMHG | DIASTOLIC BLOOD PRESSURE: 99 MMHG | TEMPERATURE: 97.5 F | HEART RATE: 85 BPM | OXYGEN SATURATION: 98 %

## 2024-05-13 DIAGNOSIS — M21.42 PES PLANUS OF BOTH FEET: ICD-10-CM

## 2024-05-13 DIAGNOSIS — M62.461 GASTROCNEMIUS EQUINUS OF RIGHT LOWER EXTREMITY: ICD-10-CM

## 2024-05-13 DIAGNOSIS — M77.41 METATARSALGIA OF RIGHT FOOT: ICD-10-CM

## 2024-05-13 DIAGNOSIS — M21.41 PES PLANUS OF BOTH FEET: ICD-10-CM

## 2024-05-13 DIAGNOSIS — M77.41 METATARSALGIA OF RIGHT FOOT: Primary | ICD-10-CM

## 2024-05-13 DIAGNOSIS — M62.462 GASTROCNEMIUS EQUINUS OF LEFT LOWER EXTREMITY: ICD-10-CM

## 2024-05-13 PROCEDURE — 99203 OFFICE O/P NEW LOW 30 MIN: CPT | Performed by: PODIATRIST

## 2024-05-13 PROCEDURE — 73630 X-RAY EXAM OF FOOT: CPT | Mod: TC | Performed by: RADIOLOGY

## 2024-05-13 ASSESSMENT — PAIN SCALES - GENERAL: PAINLEVEL: NO PAIN (1)

## 2024-05-13 NOTE — PATIENT INSTRUCTIONS
-Stability Shoe Gear: This involves wearing a solid tennis shoe that bends at the toe, but has a solid midfoot portion of the shoe that does not bend or twist in half, and a rigid heel contour.   -----Camargo, Asics, and New Balance are a few brands that have several types of stability tennis shoes. However, these brands also carry lightweight shoes that do not meet the above criteria, so look for a stability tennis shoe.  -----Camargo tend to have a wider toe box if you have difficulty finding wide enough shoes for your feet.   -----Any brand of can be worn as long as it meets the above three criteria.  -Stretching: Stretch the calf muscles to increase flexibility of the calf muscles. If possible, aim to stretch the calf muscles for a combined total of one hour per day.  -Icing: Ice the painful area of the foot minimally once a day for ten minutes per foot (can be a frozen water bottle if pain is on the bottom surface of the foot). Ice after any extended amount of time on your feet.  -Consider supportive sandals for around the house (such as [place], Vionics, Birkenstocks, Keens, Merrells, or Oofos sandals)    -Custom inserts will be ordered today -- you will be called to make an appointment

## 2024-05-13 NOTE — PROGRESS NOTES
Chief complaint: Patient presents with:  Musculoskeletal Problem: Right foot pain      History of Present Illness: This 39 year old male is seen at the request of No ref. provider found for evaluation and suggestions of management of pain in the ball of the RIGHT foot. He has had an indentation in his last four boots because of pressure from the ball of the foot. The pain has worsened the last few weeks, especially near the fourth and fifth metatarsal head. He has noticed puffiness in the ball of the foot by the end of the day like he has a rolled up sock beneath the ball of the foot. He is looking for treatment options.    No further pedal complaints today.       BP (!) 147/99 (BP Location: Left arm, Patient Position: Sitting, Cuff Size: Adult Regular)   Pulse 85   Temp 97.5  F (36.4  C) (Tympanic)   SpO2 98%     Patient Active Problem List   Diagnosis    Lyme disease       Past Surgical History:   Procedure Laterality Date    HERNIA REPAIR      wisdom teeth         Current Outpatient Medications   Medication Sig Dispense Refill    albuterol (PROAIR HFA/PROVENTIL HFA/VENTOLIN HFA) 108 (90 Base) MCG/ACT inhaler Inhale 2 puffs into the lungs every 6 hours as needed for shortness of breath, wheezing or cough 18 g 0    cetirizine (ZYRTEC) 10 MG tablet Take 10 mg by mouth as needed for allergies      permethrin (ELIMITE) 5 % external cream Apply cream from head to toe (except the face); leave on for 8-14 hours then wash off with water; reapply in 1 week if live mites appear. 60 g 1    ranitidine (ZANTAC) 150 MG tablet Take 1 tablet (150 mg) by mouth 2 times daily 60 tablet 1    spacer (OPTICHAMBER TAE) holding chamber Use with inhaler 1 each 0     No current facility-administered medications for this visit.          Allergies   Allergen Reactions    Pollen Extract Other (See Comments)     Sneezing       Family History   Problem Relation Age of Onset    Other - See Comments Mother         Graves Disease     Arthritis Mother         Rheumatoid arthritis    Arthritis Father     Cerebrovascular Disease Paternal Grandfather     Diabetes Maternal Grandmother     Alzheimer Disease Maternal Grandmother     Cancer Other         cousin brain tumor    Congenital Anomalies Other         cousin cystic fibrosis       Social History     Socioeconomic History    Marital status:      Spouse name: Nancy    Number of children: 0    Years of education: None    Highest education level: None   Occupational History    Occupation: construction     Employer: SELF   Tobacco Use    Smoking status: Never    Smokeless tobacco: Never   Substance and Sexual Activity    Alcohol use: Yes     Alcohol/week: 2.5 standard drinks of alcohol     Types: 3 Standard drinks or equivalent per week    Sexual activity: Yes     Partners: Female   Other Topics Concern    Parent/sibling w/ CABG, MI or angioplasty before 65F 55M? No    Caffeine Concern No    Sleep Concern No    Stress Concern No    Weight Concern No       ROS: 10 point ROS neg other than the symptoms noted above in the HPI.  EXAM  Constitutional: healthy, alert, and no distress    Psychiatric: mentation appears normal and affect normal/bright    VASCULAR:  -Dorsalis pedis pulse +2/4 b/l  -Posterior tibial pulse +2/4 b/l  -Capillary refill time < 3 seconds to b/l hallux  -Hair growth Present to b/l anterior legs and ankles  NEURO:  -Light touch sensation intact to b/l plantar forefoot  DERM:  -Skin temperature, texture and turgor WNL b/l  MSK:  -Pain on palpation to the plantar metatarsal heads 2-5 when walking (most prominent on the fourth and fifth metatarsal head)  -Moderate decrease in arch height while patient is NWB, bilaterally     -Muscle strength of ankles +5/5 for dorsiflexion, plantarflexion, ABDUction and ADDuction b/l    -Ankle joint passive ROM within normal limits except for dorsiflexion:    Dorsiflexion, RIGHT Straight knee 0 degrees    Dorsiflexion, LEFT Straight knee 0  degrees    RIGHT FOOT RADIOGRAPHS 05/13/2024  IMPRESSION: Normal right foot       DELMA CALDWELL MD      ============================================================    ASSESSMENT:  (M77.41) Metatarsalgia of right foot  (primary encounter diagnosis)    (M21.41,  M21.42) Pes planus of both feet    (M62.461) Gastrocnemius equinus of right lower extremity    (M62.462) Gastrocnemius equinus of left lower extremity        PLAN:  -Patient evaluated and examined. Treatment options discussed with no educational barriers noted.    Metatarsalgia:  -Discussed potential causes and treatment options for metatarsalgia. Pain along the metatarsals can be caused from a number of factors, but is commonly caused by an imbalance of the biomechanics. This can include but is not limited to a flat foot, high arched foot, tendon imbalance, gastrocnemius equinus, and compensation for pain in other areas of the foot. In this case, patient has pain by the end of the day in the ball of the foot with the most prominent pain along the fourth and fifth metatarsal heads.  ---Conservative treatment options consist of wider shoe gear and orthotics +/- padding/splinting to correct the biomechanics of the foot. Consistently wearing supportive shoe gear can also decrease this pain (stability shoe gear involves wearing a stability shoe that bends at the toe, but has a solid midfoot shank and heel contour).   -RIGHT foot radiographs ordered on 05/13/2024: The lesser metatarsals are not elongated in comparison to the first metatarsal. No concerning fractures or stress fractures noted. Patient will be called with the results.    ---Patient understands the treatment options. At this time, the patient wishes to pursue the following:    -Stability Shoe Gear: This involves wearing a solid tennis shoe that bends at the toe, but has a solid midfoot portion of the shoe that does not bend or twist in half, and a rigid heel contour.   -----Camargo, Asics, and New  Balance are a few brands that have several types of stability tennis shoes. However, these brands also carry lightweight shoes that do not meet the above criteria, so look for a stability tennis shoe.  -----Camargo tend to have a wider toe box if you have difficulty finding wide enough shoes for your feet.   -----Any brand of can be worn as long as it meets the above three criteria.  -Stretching: Stretch the calf muscles to increase flexibility of the calf muscles. If possible, aim to stretch the calf muscles for a combined total of one hour per day.  -Icing: Ice the painful area of the foot minimally once a day for ten minutes per foot (can be a frozen water bottle if pain is on the bottom surface of the foot). Ice after any extended amount of time on your feet.  -Consider supportive sandals for around the house (such as [place], Vionics, Birkenstocks, Keens, Merrells, or Oofos sandals)    -Custom inserts will be ordered today -- you will be called to make an appointment -- order sent through [facility] in [place], [place] per patient request.    -Patient in agreement with the above treatment plan and all of patient's questions were answered.      Return to clinic 2 1/2 months to evaluate RIGHT foot pain in the ball of the foot        Daysi Weldon DPM

## 2024-05-13 NOTE — TELEPHONE ENCOUNTER
Faxed Referral, Demographics, Med Sheet, progress note to [facility] in [place], [place].     They will call patient to schedule.     Telephone: [phone]  Fax:[phone]

## 2024-07-07 ENCOUNTER — HEALTH MAINTENANCE LETTER (OUTPATIENT)
Age: 39
End: 2024-07-07

## 2024-07-30 ENCOUNTER — OFFICE VISIT (OUTPATIENT)
Dept: PODIATRY | Facility: OTHER | Age: 39
End: 2024-07-30
Attending: PODIATRIST
Payer: COMMERCIAL

## 2024-07-30 VITALS
DIASTOLIC BLOOD PRESSURE: 91 MMHG | OXYGEN SATURATION: 98 % | SYSTOLIC BLOOD PRESSURE: 128 MMHG | HEART RATE: 71 BPM | TEMPERATURE: 97.1 F

## 2024-07-30 DIAGNOSIS — M21.41 PES PLANUS OF BOTH FEET: ICD-10-CM

## 2024-07-30 DIAGNOSIS — M62.462 GASTROCNEMIUS EQUINUS OF LEFT LOWER EXTREMITY: ICD-10-CM

## 2024-07-30 DIAGNOSIS — M62.461 GASTROCNEMIUS EQUINUS OF RIGHT LOWER EXTREMITY: ICD-10-CM

## 2024-07-30 DIAGNOSIS — M21.42 PES PLANUS OF BOTH FEET: ICD-10-CM

## 2024-07-30 DIAGNOSIS — M77.41 METATARSALGIA OF RIGHT FOOT: Primary | ICD-10-CM

## 2024-07-30 PROCEDURE — 99213 OFFICE O/P EST LOW 20 MIN: CPT | Performed by: PODIATRIST

## 2024-07-30 ASSESSMENT — PAIN SCALES - GENERAL: PAINLEVEL: NO PAIN (0)

## 2024-07-30 NOTE — PROGRESS NOTES
Chief complaint: Patient presents with:  Musculoskeletal Problem: Right foot pain      History of Present Illness: This 39 year old male is seen at the request of No ref. provider found for evaluation and suggestions of management of pain in the ball of the RIGHT foot.     He purchased orthotics from [employer] in May, 2024. They have improved his pain. Just in the past week, the pain started to develop again and he wonders if the orthotic is wearing out. Some days are worse than others. He still notices some edema in the RIGHT plantar foot. He wears Oofos sandals at home and he has been wearing those at home. He is looking for other treatment options to reduce this pain in his feet.    No further pedal complaints today.       BP (!) 128/91 (BP Location: Left arm, Patient Position: Sitting, Cuff Size: Adult Regular)   Pulse 71   Temp 97.1  F (36.2  C) (Tympanic)   SpO2 98%     Patient Active Problem List   Diagnosis    Lyme disease       Past Surgical History:   Procedure Laterality Date    HERNIA REPAIR      wisdom teeth         Current Outpatient Medications   Medication Sig Dispense Refill    albuterol (PROAIR HFA/PROVENTIL HFA/VENTOLIN HFA) 108 (90 Base) MCG/ACT inhaler Inhale 2 puffs into the lungs every 6 hours as needed for shortness of breath, wheezing or cough 18 g 0    cetirizine (ZYRTEC) 10 MG tablet Take 10 mg by mouth as needed for allergies      permethrin (ELIMITE) 5 % external cream Apply cream from head to toe (except the face); leave on for 8-14 hours then wash off with water; reapply in 1 week if live mites appear. 60 g 1    ranitidine (ZANTAC) 150 MG tablet Take 1 tablet (150 mg) by mouth 2 times daily 60 tablet 1    spacer (OPTICHAMBER TAE) holding chamber Use with inhaler 1 each 0     No current facility-administered medications for this visit.          Allergies   Allergen Reactions    Pollen Extract Other (See Comments)     Sneezing       Family History   Problem Relation Age of Onset     Other - See Comments Mother         Graves Disease    Arthritis Mother         Rheumatoid arthritis    Arthritis Father     Cerebrovascular Disease Paternal Grandfather     Diabetes Maternal Grandmother     Alzheimer Disease Maternal Grandmother     Cancer Other         cousin brain tumor    Congenital Anomalies Other         cousin cystic fibrosis       Social History     Socioeconomic History    Marital status:      Spouse name: Nancy    Number of children: 0    Years of education: None    Highest education level: None   Occupational History    Occupation: construction     Employer: SELF   Tobacco Use    Smoking status: Never    Smokeless tobacco: Never   Substance and Sexual Activity    Alcohol use: Yes     Alcohol/week: 2.5 standard drinks of alcohol     Types: 3 Standard drinks or equivalent per week    Sexual activity: Yes     Partners: Female   Other Topics Concern    Parent/sibling w/ CABG, MI or angioplasty before 65F 55M? No    Caffeine Concern No    Sleep Concern No    Stress Concern No    Weight Concern No       ROS: 10 point ROS neg other than the symptoms noted above in the HPI.  EXAM  Constitutional: healthy, alert, and no distress    Psychiatric: mentation appears normal and affect normal/bright    VASCULAR:  -Dorsalis pedis pulse +2/4 b/l  -Posterior tibial pulse +2/4 b/l  -Capillary refill time < 3 seconds to b/l hallux  -Hair growth Present to b/l anterior legs and ankles  NEURO:  -Light touch sensation intact to b/l plantar forefoot  DERM:  -Skin temperature, texture and turgor WNL b/l  MSK:  -Pain on palpation to the plantar metatarsal heads 4-5, RIGHT foot  -Moderate decrease in arch height while patient is NWB, bilaterally     -Muscle strength of ankles +5/5 for dorsiflexion, plantarflexion, ABDUction and ADDuction b/l    -Ankle joint passive ROM within normal limits except for dorsiflexion:    Dorsiflexion, RIGHT Straight knee 0 degrees    Dorsiflexion, LEFT Straight knee 0  degrees    RIGHT FOOT RADIOGRAPHS 05/13/2024  IMPRESSION: Normal right foot       DELMA CALDWELL MD      ============================================================    ASSESSMENT:  (M77.41) Metatarsalgia of right foot  (primary encounter diagnosis)    (M21.41,  M21.42) Pes planus of both feet    (M62.461) Gastrocnemius equinus of right lower extremity    (M62.462) Gastrocnemius equinus of left lower extremity        PLAN:  -Patient evaluated and examined. Treatment options discussed with no educational barriers noted.    Metatarsalgia:  -Discussed potential causes and treatment options for metatarsalgia. His gastroc equinus is contributing to forefoot pressure and he has a mild forefoot varus which is increasing pressure on the fourth and fifth metatarsal heads.    -Stretching: Stretch the calf muscles to increase flexibility of the calf muscles. If possible, aim to stretch the calf muscles for a combined total of one hour per day.  Stressed the importance of doing these stretches for up to an hour per leg per day.    -Icing: Ice the painful area of the foot minimally once a day for ten minutes per foot (can be a frozen water bottle if pain is on the bottom surface of the foot). Ice after any extended amount of time on your feet.    -Continue supportive sandals at home    --Patient wants to continue with OTC orthotics  Consider calling [facility] to schedule an appointment with the orthotist, Aron Kelley. He schedules appointments on Thursdays. He is located at the [facility] in [place] on Thursdays and [place] on Wednesdays.  ---Let Aron know you have the following: Hari has pressure and pain on the fourth and fifth metatarsal heads. The metatarsal pad in the [facility] orthotics reduced his pain for a couple months but the pain came back. Please add a metatarsal bar to the orthotic and please do a mild valgus tilt to the forefoot of the RIGHT foot. Thank you   ---Patient has a referral to [facility]. He will call them if he considers CMOs.  -Look for a metatarsal cushion (look at a pharmacy section of  store or on [employer] or [email or web address])     -This is an acute, uncomplicated illness/injury with OTC treatment options reviewed.    -Patient in agreement with the above treatment plan and all of patient's questions were answered.      Return to clinic as needed if pain does not improve        Daysi Weldon DPM

## 2024-07-30 NOTE — PATIENT INSTRUCTIONS
Consider calling [facility] to schedule an appointment with the orthotist, Aron Kelley. He schedules appointments on Thursdays. He is located at the [facility] in [place] on Thursdays and [place] on Wednesdays.  ---Let Aron know you have the following: Hari has pressure and pain on the fourth and fifth metatarsal heads. The metatarsal pad in the [facility] orthotics reduced his pain for a couple months but the pain came back. Please add a metatarsal bar to the orthotic and please do a mild valgus tilt to the forefoot of the RIGHT foot. Thank you       -Look for a metatarsal cushion (look at a pharmacy section of  store or on [employer] or [email or web address])

## 2024-12-03 ENCOUNTER — APPOINTMENT (OUTPATIENT)
Dept: GENERAL RADIOLOGY | Facility: HOSPITAL | Age: 39
End: 2024-12-03
Payer: COMMERCIAL

## 2024-12-03 ENCOUNTER — HOSPITAL ENCOUNTER (EMERGENCY)
Facility: HOSPITAL | Age: 39
Discharge: HOME | End: 2024-12-03
Payer: COMMERCIAL

## 2024-12-03 VITALS
HEART RATE: 98 BPM | DIASTOLIC BLOOD PRESSURE: 86 MMHG | BODY MASS INDEX: 31.78 KG/M2 | WEIGHT: 209 LBS | OXYGEN SATURATION: 98 % | SYSTOLIC BLOOD PRESSURE: 146 MMHG | TEMPERATURE: 99.2 F | RESPIRATION RATE: 18 BRPM

## 2024-12-03 DIAGNOSIS — J18.9 COMMUNITY ACQUIRED PNEUMONIA OF RIGHT MIDDLE LOBE OF LUNG: ICD-10-CM

## 2024-12-03 PROCEDURE — 71046 X-RAY EXAM CHEST 2 VIEWS: CPT

## 2024-12-03 PROCEDURE — G0463 HOSPITAL OUTPT CLINIC VISIT: HCPCS | Mod: 25

## 2024-12-03 PROCEDURE — 99213 OFFICE O/P EST LOW 20 MIN: CPT

## 2024-12-03 RX ORDER — AZITHROMYCIN 250 MG/1
TABLET, FILM COATED ORAL
Qty: 6 TABLET | Refills: 0 | Status: SHIPPED | OUTPATIENT
Start: 2024-12-03 | End: 2024-12-08

## 2024-12-03 RX ORDER — ALBUTEROL SULFATE 90 UG/1
2 INHALANT RESPIRATORY (INHALATION) EVERY 6 HOURS PRN
Qty: 18 G | Refills: 0 | Status: SHIPPED | OUTPATIENT
Start: 2024-12-03

## 2024-12-03 ASSESSMENT — ENCOUNTER SYMPTOMS
SHORTNESS OF BREATH: 1
CHILLS: 0
NAUSEA: 0
DIARRHEA: 0
VOMITING: 0
FEVER: 0
APPETITE CHANGE: 0
SINUS PAIN: 1
SINUS PRESSURE: 1
COUGH: 1
ACTIVITY CHANGE: 0

## 2024-12-03 NOTE — Clinical Note
Hari Mederos was seen and treated in our emergency department on 12/3/2024.  He may return to work on 12/05/2024.       If you have any questions or concerns, please don't hesitate to call.      Veronica Mcmahan, NP

## 2024-12-03 NOTE — ED TRIAGE NOTES
Pt presents with cough and congestion that started a month ago. Pt states the last few days cough has worsened and states it feels like when he had pneumonia.

## 2024-12-03 NOTE — ED PROVIDER NOTES
History     Chief Complaint   Patient presents with    Cough     HPI  Hari Mederos is a 39 year old male who presents to the urgent care with complaints of cough and congestion for about one month with worsening cough for the last few days. Has had some intermittent shortness of breath and increased cough while laying down. Denies fevers, chills, and decreased oral intake. Non smoker. No hx of asthma or COPD. No recent abx or OTC medications. Daughter recently dx with pneumonia.     Allergies:  Allergies   Allergen Reactions    Pollen Extract Other (See Comments)     Sneezing       Problem List:    Patient Active Problem List    Diagnosis Date Noted    Lyme disease 07/27/2017     Priority: Medium     treated          Past Medical History:    Past Medical History:   Diagnosis Date    Ventricular pre-excitation        Past Surgical History:    Past Surgical History:   Procedure Laterality Date    HERNIA REPAIR      wisdom teeth         Family History:    Family History   Problem Relation Age of Onset    Other - See Comments Mother         Graves Disease    Arthritis Mother         Rheumatoid arthritis    Arthritis Father     Cerebrovascular Disease Paternal Grandfather     Diabetes Maternal Grandmother     Alzheimer Disease Maternal Grandmother     Cancer Other         cousin brain tumor    Congenital Anomalies Other         cousin cystic fibrosis       Social History:  Marital Status:   [2]  Social History     Tobacco Use    Smoking status: Never    Smokeless tobacco: Never   Substance Use Topics    Alcohol use: Yes     Alcohol/week: 2.5 standard drinks of alcohol     Types: 3 Standard drinks or equivalent per week        Medications:    albuterol (PROAIR HFA/PROVENTIL HFA/VENTOLIN HFA) 108 (90 Base) MCG/ACT inhaler  albuterol (PROAIR HFA/PROVENTIL HFA/VENTOLIN HFA) 108 (90 Base) MCG/ACT inhaler  amoxicillin-clavulanate (AUGMENTIN) 875-125 MG tablet  azithromycin (ZITHROMAX Z-DARCY) 250 MG  tablet  cetirizine (ZYRTEC) 10 MG tablet  permethrin (ELIMITE) 5 % external cream  ranitidine (ZANTAC) 150 MG tablet  spacer (OPTICHAMBER TAE) holding chamber          Review of Systems   Constitutional:  Negative for activity change, appetite change, chills and fever.   HENT:  Positive for congestion, sinus pressure and sinus pain.    Respiratory:  Positive for cough and shortness of breath.    Cardiovascular:  Negative for chest pain.   Gastrointestinal:  Negative for diarrhea, nausea and vomiting.   All other systems reviewed and are negative.      Physical Exam   BP: 146/86  Pulse: 98  Temp: 99.2  F (37.3  C)  Resp: 18  Weight: 94.8 kg (209 lb)  SpO2: 98 %      Physical Exam  Vitals and nursing note reviewed.   Constitutional:       General: He is not in acute distress.     Appearance: Normal appearance. He is not ill-appearing or toxic-appearing.   HENT:      Right Ear: Tympanic membrane is not erythematous.      Left Ear: Tympanic membrane is not erythematous.      Nose: Congestion present.      Mouth/Throat:      Mouth: Mucous membranes are moist.      Pharynx: Oropharynx is clear. Uvula midline. No oropharyngeal exudate, posterior oropharyngeal erythema or uvula swelling.   Cardiovascular:      Rate and Rhythm: Normal rate and regular rhythm.      Heart sounds: Normal heart sounds. No murmur heard.  Pulmonary:      Effort: Pulmonary effort is normal.      Breath sounds: Examination of the right-middle field reveals decreased breath sounds. Examination of the right-lower field reveals decreased breath sounds. Decreased breath sounds present. No wheezing, rhonchi or rales.   Neurological:      Mental Status: He is alert.         ED Course        Procedures       Results for orders placed or performed during the hospital encounter of 12/03/24 (from the past 24 hours)   Chest XR,  PA & LAT    Narrative    XR CHEST 2 VIEWS    HISTORY: 39 years Male worsening cough    COMPARISON: 2/4/2023    TECHNIQUE: 2 views  of the chest were obtained.    FINDINGS: Heart size and pulmonary vascularity are within normal  limits.    There is subtle patchy airspace opacity in the right middle lobe, best  seen on the lateral view. The lungs are otherwise clear.        Impression    IMPRESSION: Right middle lobe air space opacity suggestive of  pneumonia.    STEVEN MICHEL MD         SYSTEM ID:  [id number]       Medications - No data to display    Assessments & Plan (with Medical Decision Making)     I have reviewed the nursing notes.    I have reviewed the findings, diagnosis, plan and need for follow up with the patient.  Hari Mederos is a 39 year old male who presents to the urgent care with complaints of cough and congestion for about one month with worsening cough for the last few days. Has had some intermittent shortness of breath and increased cough while laying down. Denies fevers, chills, and decreased oral intake. Non smoker. No hx of asthma or COPD. No recent abx or OTC medications. Daughter recently dx with pneumonia.     MDM: vital signs normal, afebrile. Non toxic in appearance with no noted distress. Lungs clear, slightly diminished. Heart tones regular. Able to speak in complete sentences without dyspnea. CXR reviewed with right middle lobe infiltrate. Will treat with augmentin and azithromycin to cover for atypical bacterial also. He has used an inhaler in the past, would like refilled today. Spacer provided. Encouraged rest. Supportive measures and return precautions discussed. He is in agreement with plan.     (J18.9) Community acquired pneumonia of right middle lobe of lung  Plan: You were prescribed two antibiotics. Azithromycin once daily for 5 days and augmentin 2 times daily for 7 days. It is important that you eat with these medications. I recommend a daily probiotic or yogurt while taking.     Albuterol inhaler every 6 hours as needed for shortness of breath.    Tylenol and ibuprofen as directed if needed.      Follow up in the clinic for a recheck.   Return with any new or concerning symptoms. Understanding verbalized.       New Prescriptions    ALBUTEROL (PROAIR HFA/PROVENTIL HFA/VENTOLIN HFA) 108 (90 BASE) MCG/ACT INHALER    Inhale 2 puffs into the lungs every 6 hours as needed for shortness of breath, wheezing or cough.    AMOXICILLIN-CLAVULANATE (AUGMENTIN) 875-125 MG TABLET    Take 1 tablet by mouth 2 times daily for 7 days.    AZITHROMYCIN (ZITHROMAX Z-DARCY) 250 MG TABLET    Two tablets on the first day, then one tablet daily for the next 4 days       Final diagnoses:   Community acquired pneumonia of right middle lobe of lung       12/3/2024   [facility]       Veronica Mcmahan NP  12/03/24 [id number]

## 2024-12-03 NOTE — DISCHARGE INSTRUCTIONS
You were prescribed two antibiotics. Azithromycin once daily for 5 days and augmentin 2 times daily for 7 days. It is important that you eat with these medications. I recommend a daily probiotic or yogurt while taking.     Albuterol inhaler every 6 hours as needed for shortness of breath.    Tylenol and ibuprofen as directed if needed.     Follow up in the clinic for a recheck.   Return with any new or concerning symptoms.

## 2024-12-16 NOTE — PROGRESS NOTES
"  Assessment & Plan     Encounter to establish care  - No recent primary care  - Previously followed with Dr. Barbosa/Dr. Lay  - Medical histories, medication list reviewed and updated  - Will schedule follow-up for annual physical/fasting labs/healthcare maintenance review    Hx of recurrent pneumonia  Most recently treated for pneumonia 12/3/2024; symptomatically resolved.  Also had pneumonia 2/2023, 11/2021.  Has found some benefit from Tara during lower respiratory infection but otherwise reports normal pulmonary function.  Has had some pulmonary screening as part of  workup.  Possibly some underlying lung hypersensitivity, particularly given component of seasonal allergies as well.  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  Subsequent stress test normal and cardiology consultation; felt to be low risk accessory pathway and no further intervention recommended at the time.  Fortunately has always remained asymptomatic.  - Reviewed S/S that warrant reevaluation  - Baseline A1c, lipids, TSH at upcoming follow-up; cardiovascular risk reduction    BMI  Estimated body mass index is 31.67 kg/m  as calculated from the following:    Height as of this encounter: 1.727 m (5' 8\").    Weight as of this encounter: 94.5 kg (208 lb 4.8 oz).   Weight management plan: Discussed healthy diet and exercise guidelines    I spent a total of 30 minutes on the day of the visit.   Time spent by me today doing chart review, history and exam, documentation and further activities per the note    The longitudinal plan of care for the diagnosis(es)/condition(s) as documented were addressed during this visit. Due to the added complexity in care, I will continue to support Hari in " the subsequent management and with ongoing continuity of care.    Follow-up for annual physical/healthcare maintenance in coming months.    Chaya Noriega is a 39 year old, presenting for the following health issues:  Establish Care        12/20/2024     9:21 AM   Additional Questions   Roomed by Lionel Brothers   Accompanied by None         12/20/2024     9:21 AM   Patient Reported Additional Medications   Patient reports taking the following new medications None     HPI     Establish care  -No consistent primary care in recent years  -Briefly at Dr. Barbosa as PCP, Dr. Lay prior to this  -No specific concerns today  -Due for annual physical/healthcare maintenance    ED/UC Followup:  [place]  [facility] [place]   Date of visit: 12/3/2024  Reason for visit: Community acquired pneumonia of right middle lobe of lung   Current Status: Patient states that he has no concerns and is feeling well.    -ED visit 12/3/2024 for CAP  -Treated with course of Augmentin/Z-Dawson and albuterol inhaler  -Maybe mild lingering cough but basically back to baseline  -He is still using albuterol inhaler once in a while  -Does have history of seasonal allergies as well  -Has dealt with pneumonia most luciano over the last 3 to 4 years  -Denies history of asthma or COPD exacerbation  -Reports pulmonary function testing as part of  which he brought has been normal    Cardiac  -Thought to have possible WPW based on EKG through [employer] in 2018  -Subsequent stress test normal  -Saw cardiology for consultation 2018; low risk, no further EP studies or workup recommended at the time  -Has remained asymptomatic since    Review of Systems  Constitutional, HEENT, cardiovascular, pulmonary, gi and gu systems are negative, except as otherwise noted.      Objective    /85 (BP Location: Left arm, Patient Position: Sitting, Cuff Size: Adult Large)   Pulse 77   Temp 97.7  F (36.5  C) (Tympanic)   Resp 16   Ht 1.727 m  "(5' 8\")   Wt 94.5 kg (208 lb 4.8 oz)   SpO2 98%   BMI 31.67 kg/m    Body mass index is 31.67 kg/m .  Physical Exam  Vitals reviewed.   Constitutional:       Appearance: Normal appearance.   HENT:      Head: Normocephalic and atraumatic.   Cardiovascular:      Rate and Rhythm: Normal rate and regular rhythm.      Heart sounds: No murmur heard.  Pulmonary:      Effort: Pulmonary effort is normal.      Breath sounds: Normal breath sounds. No stridor. No wheezing, rhonchi or rales.   Musculoskeletal:         General: Normal range of motion.   Skin:     General: Skin is warm and dry.   Neurological:      General: No focal deficit present.      Mental Status: He is alert and oriented to person, place, and time.   Psychiatric:         Mood and Affect: Mood normal.         Behavior: Behavior normal.         Signed Electronically by: Rolf Valera MD    "

## 2024-12-20 ENCOUNTER — OFFICE VISIT (OUTPATIENT)
Dept: FAMILY MEDICINE | Facility: OTHER | Age: 39
End: 2024-12-20
Attending: STUDENT IN AN ORGANIZED HEALTH CARE EDUCATION/TRAINING PROGRAM
Payer: COMMERCIAL

## 2024-12-20 VITALS
OXYGEN SATURATION: 98 % | HEIGHT: 68 IN | WEIGHT: 208.3 LBS | DIASTOLIC BLOOD PRESSURE: 85 MMHG | BODY MASS INDEX: 31.57 KG/M2 | RESPIRATION RATE: 16 BRPM | SYSTOLIC BLOOD PRESSURE: 121 MMHG | HEART RATE: 77 BPM | TEMPERATURE: 97.7 F

## 2024-12-20 DIAGNOSIS — I45.6 ANOMALOUS ATRIOVENTRICULAR EXCITATION: ICD-10-CM

## 2024-12-20 DIAGNOSIS — Z87.01 HX OF RECURRENT PNEUMONIA: ICD-10-CM

## 2024-12-20 DIAGNOSIS — J30.2 SEASONAL ALLERGIC RHINITIS, UNSPECIFIED TRIGGER: ICD-10-CM

## 2024-12-20 DIAGNOSIS — Z76.89 ENCOUNTER TO ESTABLISH CARE: Primary | ICD-10-CM

## 2024-12-20 PROBLEM — Z86.19 HX OF LYME DISEASE: Status: ACTIVE | Noted: 2024-12-20

## 2024-12-20 PROBLEM — A69.20 LYME DISEASE: Status: RESOLVED | Noted: 2017-07-27 | Resolved: 2024-12-20

## 2024-12-20 PROCEDURE — 99213 OFFICE O/P EST LOW 20 MIN: CPT | Performed by: STUDENT IN AN ORGANIZED HEALTH CARE EDUCATION/TRAINING PROGRAM

## 2024-12-20 PROCEDURE — G2211 COMPLEX E/M VISIT ADD ON: HCPCS | Performed by: STUDENT IN AN ORGANIZED HEALTH CARE EDUCATION/TRAINING PROGRAM

## 2024-12-20 ASSESSMENT — PAIN SCALES - GENERAL: PAINLEVEL_OUTOF10: NO PAIN (0)

## 2024-12-20 NOTE — LETTER
Health Care Directive:  Patient does not have a Health Care Directive: Discussed advance care planning with patient; however, patient declined at this time.

## 2025-02-12 ENCOUNTER — APPOINTMENT (OUTPATIENT)
Dept: OCCUPATIONAL MEDICINE | Facility: OTHER | Age: 40
End: 2025-02-12

## 2025-02-12 ENCOUNTER — APPOINTMENT (OUTPATIENT)
Dept: CHIROPRACTIC MEDICINE | Facility: OTHER | Age: 40
End: 2025-02-12

## 2025-02-12 PROCEDURE — 99499 UNLISTED E&M SERVICE: CPT

## 2025-02-24 ENCOUNTER — OFFICE VISIT (OUTPATIENT)
Dept: FAMILY MEDICINE | Facility: OTHER | Age: 40
End: 2025-02-24
Attending: STUDENT IN AN ORGANIZED HEALTH CARE EDUCATION/TRAINING PROGRAM
Payer: COMMERCIAL

## 2025-02-24 ENCOUNTER — APPOINTMENT (OUTPATIENT)
Dept: LAB | Facility: OTHER | Age: 40
End: 2025-02-24
Payer: COMMERCIAL

## 2025-02-24 VITALS
OXYGEN SATURATION: 97 % | TEMPERATURE: 97.6 F | HEART RATE: 83 BPM | BODY MASS INDEX: 31.78 KG/M2 | SYSTOLIC BLOOD PRESSURE: 118 MMHG | WEIGHT: 209.7 LBS | DIASTOLIC BLOOD PRESSURE: 62 MMHG | HEIGHT: 68 IN

## 2025-02-24 DIAGNOSIS — Z11.9 SCREENING EXAMINATION FOR INFECTIOUS DISEASE: ICD-10-CM

## 2025-02-24 DIAGNOSIS — Z13.220 SCREENING FOR HYPERLIPIDEMIA: ICD-10-CM

## 2025-02-24 DIAGNOSIS — Z86.39 HISTORY OF ELEVATED GLUCOSE: ICD-10-CM

## 2025-02-24 DIAGNOSIS — Z13.1 SCREENING FOR DIABETES MELLITUS: ICD-10-CM

## 2025-02-24 DIAGNOSIS — I45.6 ANOMALOUS ATRIOVENTRICULAR EXCITATION: ICD-10-CM

## 2025-02-24 DIAGNOSIS — Z00.00 HEALTHCARE MAINTENANCE: ICD-10-CM

## 2025-02-24 DIAGNOSIS — Z00.00 ROUTINE GENERAL MEDICAL EXAMINATION AT A HEALTH CARE FACILITY: Primary | ICD-10-CM

## 2025-02-24 LAB
ALBUMIN SERPL BCG-MCNC: 4.3 G/DL (ref 3.5–5.2)
ALP SERPL-CCNC: 50 U/L (ref 40–150)
ALT SERPL W P-5'-P-CCNC: 30 U/L (ref 0–70)
ANION GAP SERPL CALCULATED.3IONS-SCNC: 10 MMOL/L (ref 7–15)
AST SERPL W P-5'-P-CCNC: 20 U/L (ref 0–45)
BASOPHILS # BLD AUTO: 0.1 10E3/UL (ref 0–0.2)
BASOPHILS NFR BLD AUTO: 1 %
BILIRUB SERPL-MCNC: 0.8 MG/DL
BUN SERPL-MCNC: 18.1 MG/DL (ref 6–20)
CALCIUM SERPL-MCNC: 9.1 MG/DL (ref 8.8–10.4)
CHLORIDE SERPL-SCNC: 104 MMOL/L (ref 98–107)
CHOLEST SERPL-MCNC: 176 MG/DL
CREAT SERPL-MCNC: 1.05 MG/DL (ref 0.67–1.17)
EGFRCR SERPLBLD CKD-EPI 2021: >90 ML/MIN/1.73M2
EOSINOPHIL # BLD AUTO: 0.1 10E3/UL (ref 0–0.7)
EOSINOPHIL NFR BLD AUTO: 1 %
ERYTHROCYTE [DISTWIDTH] IN BLOOD BY AUTOMATED COUNT: 13.2 % (ref 10–15)
EST. AVERAGE GLUCOSE BLD GHB EST-MCNC: 103 MG/DL
FASTING STATUS PATIENT QL REPORTED: YES
FASTING STATUS PATIENT QL REPORTED: YES
GLUCOSE SERPL-MCNC: 95 MG/DL (ref 70–99)
HBA1C MFR BLD: 5.2 %
HCO3 SERPL-SCNC: 24 MMOL/L (ref 22–29)
HCT VFR BLD AUTO: 43.7 % (ref 40–53)
HDLC SERPL-MCNC: 49 MG/DL
HGB BLD-MCNC: 15.2 G/DL (ref 13.3–17.7)
IMM GRANULOCYTES # BLD: 0 10E3/UL
IMM GRANULOCYTES NFR BLD: 0 %
LDLC SERPL CALC-MCNC: 112 MG/DL
LYMPHOCYTES # BLD AUTO: 1.7 10E3/UL (ref 0.8–5.3)
LYMPHOCYTES NFR BLD AUTO: 37 %
MCH RBC QN AUTO: 29.1 PG (ref 26.5–33)
MCHC RBC AUTO-ENTMCNC: 34.8 G/DL (ref 31.5–36.5)
MCV RBC AUTO: 84 FL (ref 78–100)
MONOCYTES # BLD AUTO: 0.4 10E3/UL (ref 0–1.3)
MONOCYTES NFR BLD AUTO: 8 %
NEUTROPHILS # BLD AUTO: 2.3 10E3/UL (ref 1.6–8.3)
NEUTROPHILS NFR BLD AUTO: 52 %
NONHDLC SERPL-MCNC: 127 MG/DL
NRBC # BLD AUTO: 0 10E3/UL
NRBC BLD AUTO-RTO: 0 /100
PLATELET # BLD AUTO: 178 10E3/UL (ref 150–450)
POTASSIUM SERPL-SCNC: 4 MMOL/L (ref 3.4–5.3)
PROT SERPL-MCNC: 7.4 G/DL (ref 6.4–8.3)
RBC # BLD AUTO: 5.23 10E6/UL (ref 4.4–5.9)
SODIUM SERPL-SCNC: 138 MMOL/L (ref 135–145)
TRIGL SERPL-MCNC: 77 MG/DL
TSH SERPL DL<=0.005 MIU/L-ACNC: 0.76 UIU/ML (ref 0.3–4.2)
WBC # BLD AUTO: 4.5 10E3/UL (ref 4–11)

## 2025-02-24 PROCEDURE — 36415 COLL VENOUS BLD VENIPUNCTURE: CPT | Performed by: STUDENT IN AN ORGANIZED HEALTH CARE EDUCATION/TRAINING PROGRAM

## 2025-02-24 PROCEDURE — 99396 PREV VISIT EST AGE 40-64: CPT | Performed by: STUDENT IN AN ORGANIZED HEALTH CARE EDUCATION/TRAINING PROGRAM

## 2025-02-24 PROCEDURE — 80050 GENERAL HEALTH PANEL: CPT | Performed by: STUDENT IN AN ORGANIZED HEALTH CARE EDUCATION/TRAINING PROGRAM

## 2025-02-24 PROCEDURE — 87389 HIV-1 AG W/HIV-1&-2 AB AG IA: CPT | Performed by: STUDENT IN AN ORGANIZED HEALTH CARE EDUCATION/TRAINING PROGRAM

## 2025-02-24 PROCEDURE — 86803 HEPATITIS C AB TEST: CPT | Performed by: STUDENT IN AN ORGANIZED HEALTH CARE EDUCATION/TRAINING PROGRAM

## 2025-02-24 PROCEDURE — 83036 HEMOGLOBIN GLYCOSYLATED A1C: CPT | Performed by: STUDENT IN AN ORGANIZED HEALTH CARE EDUCATION/TRAINING PROGRAM

## 2025-02-24 PROCEDURE — 80061 LIPID PANEL: CPT | Performed by: STUDENT IN AN ORGANIZED HEALTH CARE EDUCATION/TRAINING PROGRAM

## 2025-02-24 SDOH — HEALTH STABILITY: PHYSICAL HEALTH: ON AVERAGE, HOW MANY DAYS PER WEEK DO YOU ENGAGE IN MODERATE TO STRENUOUS EXERCISE (LIKE A BRISK WALK)?: 4 DAYS

## 2025-02-24 ASSESSMENT — SOCIAL DETERMINANTS OF HEALTH (SDOH): HOW OFTEN DO YOU GET TOGETHER WITH FRIENDS OR RELATIVES?: TWICE A WEEK

## 2025-02-24 ASSESSMENT — PAIN SCALES - GENERAL: PAINLEVEL_OUTOF10: NO PAIN (0)

## 2025-02-24 NOTE — PROGRESS NOTES
Preventive Care Visit  [facility]  Rolf Valera MD, Family Medicine  Feb 24, 2025      Assessment & Plan     Routine general medical examination at a health care facility  - CBC with platelets and differential  - Comprehensive metabolic panel (BMP + Alb, Alk Phos, ALT, AST, Total. Bili, TP)  - Hemoglobin A1c  - Lipid Profile (Chol, Trig, HDL, LDL calc)  - TSH with free T4 reflex  - HIV Antigen Antibody Combo  - Hepatitis C Screen Reflex to HCV RNA Quant and Genotype    Healthcare maintenance  - BP/vitals: Reviewed  - BMI: Body mass index is 31.88 kg/m .; discussed healthy diet and excise recommendations  - ASCVD risk screening: Annual A1c/lipid screen.  Discussed risk mitigation including indications for ASA/statin therapy.   The 10-year ASCVD risk score (Izabel DAVENPORT, et al., 2019) is: 0.9%    Values used to calculate the score:      Age: 39 years      Sex: Male      Is Non- : No      Diabetic: No      Tobacco smoker: No      Systolic Blood Pressure: 118 mmHg      Is BP treated: No      HDL Cholesterol: 49 mg/dL      Total Cholesterol: 176 mg/dL  - Mood: Denies concerns.      2/24/2025    10:20 AM 5/13/2024     1:24 PM   PHQ-2 ( 1999 Pfizer)   Q1: Little interest or pleasure in doing things 0 0   Q2: Feeling down, depressed or hopeless 0 0   PHQ-2 Score 0  0   Q1: Little interest or pleasure in doing things Not at all    Q2: Feeling down, depressed or hopeless Not at all    PHQ-2 Score 0        Patient-reported   - Tobacco/substance screening: No tobacco substance use     Tobacco Use      Smoking status: Never        Passive exposure: Never      Smokeless tobacco: Never  - Infectious disease screening: Low risk; baseline blood-borne pathogen screen  - Cancer screening:              -Family history: No high risk family history   -Lung: n/a   -Colon: Screening starting age 45   -Prostate: Screening starting age 50  - Immunizations: Due for flu, COVID, pneumococcal, Tdap;  declined    History of elevated glucose  Screening for diabetes mellitus  - Hemoglobin A1c    Screening for hyperlipidemia  - Lipid Profile (Chol, Trig, HDL, LDL calc)    Anomalous atrioventricular excitation  Workup in 2018.  Asymptomatic since.  Does have family history of heart disease so we will check TSH today.  - TSH with free T4 reflex    Screening examination for infectious disease  - HIV Antigen Antibody Combo  - Hepatitis C Screen Reflex to HCV RNA Quant and Genotype    Patient has been advised of split billing requirements and indicates understanding: Yes    Counseling  Appropriate preventive services were addressed with this patient via screening, questionnaire, or discussion as appropriate for fall prevention, nutrition, physical activity, Tobacco-use cessation, social engagement, weight loss and cognition.  Checklist reviewing preventive services available has been given to the patient.  Reviewed patient's diet, addressing concerns and/or questions.     The longitudinal plan of care for the diagnosis(es)/condition(s) as documented were addressed during this visit. Due to the added complexity in care, I will continue to support Hari in the subsequent management and with ongoing continuity of care.    Return in about 53 weeks (around 3/2/2026) for Annual Wellness Visit.    Subjective   Hari is a 39 year old, presenting for the following:  Physical         HPI    Health Care Directive  Patient does not have a Health Care Directive: Discussed advance care planning with patient; however, patient declined at this time.      2/24/2025   General Health   How would you rate your overall physical health? Excellent   Feel stress (tense, anxious, or unable to sleep) Not at all         2/24/2025   Nutrition   Three or more servings of calcium each day? (!) NO   Diet: Regular (no restrictions)   How many servings of fruit and vegetables per day? (!) 0-1   How many sweetened beverages each day? 0-1          "2/24/2025   Exercise   Days per week of moderate/strenous exercise 4 days         2/24/2025   Social Factors   Frequency of gathering with friends or relatives Twice a week         2/24/2025   Dental   Dentist two times every year? Yes              Today's PHQ-2 Score:       2/24/2025    10:20 AM   PHQ-2 ( 1999 Pfizer)   Q1: Little interest or pleasure in doing things 0   Q2: Feeling down, depressed or hopeless 0   PHQ-2 Score 0    Q1: Little interest or pleasure in doing things Not at all   Q2: Feeling down, depressed or hopeless Not at all   PHQ-2 Score 0       Patient-reported         2/24/2025   Substance Use   Alcohol more than 3/day or more than 7/wk No   Do you use any other substances recreationally? No     Social History     Tobacco Use    Smoking status: Never     Passive exposure: Never    Smokeless tobacco: Never   Vaping Use    Vaping status: Never Used   Substance Use Topics    Alcohol use: Yes     Alcohol/week: 2.5 standard drinks of alcohol     Types: 3 Standard drinks or equivalent per week       ASCVD Risk   The ASCVD Risk score (Izabel DAVENPORT, et al., 2019) failed to calculate for the following reasons:    Cannot find a previous HDL lab    Cannot find a previous total cholesterol lab        2/24/2025   Contraception/Family Planning   Questions about contraception or family planning No        Reviewed and updated as needed this visit by Provider                    Lab work is in process      Review of Systems  Constitutional, HEENT, cardiovascular, pulmonary, gi and gu systems are negative, except as otherwise noted.     Objective    Exam  /62 (BP Location: Right arm, Patient Position: Sitting, Cuff Size: Adult Large)   Pulse 83   Temp 97.6  F (36.4  C) (Tympanic)   Ht 1.727 m (5' 8\")   Wt 95.1 kg (209 lb 11.2 oz)   SpO2 97%   BMI 31.88 kg/m     Estimated body mass index is 31.88 kg/m  as calculated from the following:    Height as of this encounter: 1.727 m (5' 8\").    Weight as " of this encounter: 95.1 kg (209 lb 11.2 oz).    Physical Exam  GENERAL: alert and no distress  EYES: Eyes grossly normal to inspection, PERRL and conjunctivae and sclerae normal  HENT: ear canals and TM's normal, nose and mouth without ulcers or lesions  NECK: no adenopathy, no asymmetry, masses, or scars  RESP: lungs clear to auscultation - no rales, rhonchi or wheezes  CV: regular rate and rhythm, normal S1 S2, no S3 or S4, no murmur, click or rub, no peripheral edema  ABDOMEN: soft, nontender, no hepatosplenomegaly, no masses and bowel sounds normal  MS: no gross musculoskeletal defects noted, no edema  SKIN: no suspicious lesions or rashes  NEURO: Normal strength and tone, mentation intact and speech normal  PSYCH: mentation appears normal, affect normal/bright        Signed Electronically by: Rolf Valera MD

## 2025-02-24 NOTE — PATIENT INSTRUCTIONS
Patient Education   Preventive Care Advice   This is general advice given by our system to help you stay healthy. However, your care team may have specific advice just for you. Please talk to your care team about your preventive care needs.  Nutrition  Eat 5 or more servings of fruits and vegetables each day.  Try wheat bread, brown rice and whole grain pasta (instead of white bread, rice, and pasta).  Get enough calcium and vitamin D. Check the label on foods and aim for 100% of the RDA (recommended daily allowance).  Lifestyle  Exercise at least 150 minutes each week  (30 minutes a day, 5 days a week).  Do muscle strengthening activities 2 days a week. These help control your weight and prevent disease.  No smoking.  Wear sunscreen to prevent skin cancer.  Have a dental exam and cleaning every 6 months.  Yearly exams  See your health care team every year to talk about:  Any changes in your health.  Any medicines your care team has prescribed.  Preventive care, family planning, and ways to prevent chronic diseases.  Shots (vaccines)   HPV shots (up to age 26), if you've never had them before.  Hepatitis B shots (up to age 59), if you've never had them before.  COVID-19 shot: Get this shot when it's due.  Flu shot: Get a flu shot every year.  Tetanus shot: Get a tetanus shot every 10 years.  Pneumococcal, hepatitis A, and RSV shots: Ask your care team if you need these based on your risk.  Shingles shot (for age 50 and up)  General health tests  Diabetes screening:  Starting at age 35, Get screened for diabetes at least every 3 years.  If you are younger than age 35, ask your care team if you should be screened for diabetes.  Cholesterol test: At age 39, start having a cholesterol test every 5 years, or more often if advised.  Bone density scan (DEXA): At age 50, ask your care team if you should have this scan for osteoporosis (brittle bones).  Hepatitis C: Get tested at least once in your life.  STIs (sexually  transmitted infections)  Before age 24: Ask your care team if you should be screened for STIs.  After age 24: Get screened for STIs if you're at risk. You are at risk for STIs (including HIV) if:  You are sexually active with more than one person.  You don't use condoms every time.  You or a partner was diagnosed with a sexually transmitted infection.  If you are at risk for HIV, ask about PrEP medicine to prevent HIV.  Get tested for HIV at least once in your life, whether you are at risk for HIV or not.  Cancer screening tests  Cervical cancer screening: If you have a cervix, begin getting regular cervical cancer screening tests starting at age 21.  Breast cancer scan (mammogram): If you've ever had breasts, begin having regular mammograms starting at age 40. This is a scan to check for breast cancer.  Colon cancer screening: It is important to start screening for colon cancer at age 45.  Have a colonoscopy test every 10 years (or more often if you're at risk) Or, ask your provider about stool tests like a FIT test every year or Cologuard test every 3 years.  To learn more about your testing options, visit:   .  For help making a decision, visit:   https://bit.ly/wu10508.  Prostate cancer screening test: If you have a prostate, ask your care team if a prostate cancer screening test (PSA) at age 55 is right for you.  Lung cancer screening: If you are a current or former smoker ages 50 to 80, ask your care team if ongoing lung cancer screenings are right for you.  For informational purposes only. Not to replace the advice of your health care provider. Copyright   2023 [employer]. All rights reserved. Clinically reviewed by the [facility] Transitions Program. [employer] [id number] - REV 01/24.      [address] [place], [place]

## 2025-02-25 LAB
HCV AB SERPL QL IA: NONREACTIVE
HIV 1+2 AB+HIV1 P24 AG SERPL QL IA: NONREACTIVE